# Patient Record
Sex: FEMALE | Race: WHITE | NOT HISPANIC OR LATINO | URBAN - METROPOLITAN AREA
[De-identification: names, ages, dates, MRNs, and addresses within clinical notes are randomized per-mention and may not be internally consistent; named-entity substitution may affect disease eponyms.]

---

## 2017-06-15 ENCOUNTER — OUTPATIENT (OUTPATIENT)
Dept: OUTPATIENT SERVICES | Facility: HOSPITAL | Age: 72
LOS: 1 days | Discharge: HOME | End: 2017-06-15

## 2017-06-28 DIAGNOSIS — K76.89 OTHER SPECIFIED DISEASES OF LIVER: ICD-10-CM

## 2017-06-28 DIAGNOSIS — Z11.1 ENCOUNTER FOR SCREENING FOR RESPIRATORY TUBERCULOSIS: ICD-10-CM

## 2017-06-28 DIAGNOSIS — N39.0 URINARY TRACT INFECTION, SITE NOT SPECIFIED: ICD-10-CM

## 2017-06-28 DIAGNOSIS — N18.2 CHRONIC KIDNEY DISEASE, STAGE 2 (MILD): ICD-10-CM

## 2017-06-28 DIAGNOSIS — G43.009 MIGRAINE WITHOUT AURA, NOT INTRACTABLE, WITHOUT STATUS MIGRAINOSUS: ICD-10-CM

## 2017-06-28 DIAGNOSIS — I10 ESSENTIAL (PRIMARY) HYPERTENSION: ICD-10-CM

## 2017-06-28 DIAGNOSIS — E11.9 TYPE 2 DIABETES MELLITUS WITHOUT COMPLICATIONS: ICD-10-CM

## 2017-06-28 DIAGNOSIS — E55.9 VITAMIN D DEFICIENCY, UNSPECIFIED: ICD-10-CM

## 2017-06-28 DIAGNOSIS — E78.00 PURE HYPERCHOLESTEROLEMIA, UNSPECIFIED: ICD-10-CM

## 2017-06-28 DIAGNOSIS — D64.9 ANEMIA, UNSPECIFIED: ICD-10-CM

## 2017-09-12 ENCOUNTER — OUTPATIENT (OUTPATIENT)
Dept: OUTPATIENT SERVICES | Facility: HOSPITAL | Age: 72
LOS: 1 days | Discharge: HOME | End: 2017-09-12

## 2017-09-12 DIAGNOSIS — E11.9 TYPE 2 DIABETES MELLITUS WITHOUT COMPLICATIONS: ICD-10-CM

## 2017-09-12 DIAGNOSIS — N18.2 CHRONIC KIDNEY DISEASE, STAGE 2 (MILD): ICD-10-CM

## 2017-09-12 DIAGNOSIS — N39.0 URINARY TRACT INFECTION, SITE NOT SPECIFIED: ICD-10-CM

## 2017-09-12 DIAGNOSIS — E78.00 PURE HYPERCHOLESTEROLEMIA, UNSPECIFIED: ICD-10-CM

## 2017-09-12 DIAGNOSIS — D64.9 ANEMIA, UNSPECIFIED: ICD-10-CM

## 2017-09-12 DIAGNOSIS — K76.89 OTHER SPECIFIED DISEASES OF LIVER: ICD-10-CM

## 2017-09-12 DIAGNOSIS — E55.9 VITAMIN D DEFICIENCY, UNSPECIFIED: ICD-10-CM

## 2017-10-05 ENCOUNTER — OUTPATIENT (OUTPATIENT)
Dept: OUTPATIENT SERVICES | Facility: HOSPITAL | Age: 72
LOS: 1 days | Discharge: HOME | End: 2017-10-05

## 2017-10-11 DIAGNOSIS — Z12.11 ENCOUNTER FOR SCREENING FOR MALIGNANT NEOPLASM OF COLON: ICD-10-CM

## 2017-10-11 DIAGNOSIS — D12.8 BENIGN NEOPLASM OF RECTUM: ICD-10-CM

## 2017-10-11 DIAGNOSIS — K57.30 DIVERTICULOSIS OF LARGE INTESTINE WITHOUT PERFORATION OR ABSCESS WITHOUT BLEEDING: ICD-10-CM

## 2017-10-11 DIAGNOSIS — E78.00 PURE HYPERCHOLESTEROLEMIA, UNSPECIFIED: ICD-10-CM

## 2017-10-11 DIAGNOSIS — K64.8 OTHER HEMORRHOIDS: ICD-10-CM

## 2017-10-11 DIAGNOSIS — Z88.1 ALLERGY STATUS TO OTHER ANTIBIOTIC AGENTS STATUS: ICD-10-CM

## 2017-10-12 ENCOUNTER — OUTPATIENT (OUTPATIENT)
Dept: OUTPATIENT SERVICES | Facility: HOSPITAL | Age: 72
LOS: 1 days | Discharge: HOME | End: 2017-10-12

## 2017-10-12 DIAGNOSIS — R80.9 PROTEINURIA, UNSPECIFIED: ICD-10-CM

## 2017-10-12 DIAGNOSIS — E11.22 TYPE 2 DIABETES MELLITUS WITH DIABETIC CHRONIC KIDNEY DISEASE: ICD-10-CM

## 2018-01-18 ENCOUNTER — OUTPATIENT (OUTPATIENT)
Dept: OUTPATIENT SERVICES | Facility: HOSPITAL | Age: 73
LOS: 1 days | Discharge: HOME | End: 2018-01-18

## 2018-01-18 DIAGNOSIS — D89.2 HYPERGAMMAGLOBULINEMIA, UNSPECIFIED: ICD-10-CM

## 2018-01-18 DIAGNOSIS — R80.8 OTHER PROTEINURIA: ICD-10-CM

## 2018-07-16 ENCOUNTER — OUTPATIENT (OUTPATIENT)
Dept: OUTPATIENT SERVICES | Facility: HOSPITAL | Age: 73
LOS: 1 days | Discharge: HOME | End: 2018-07-16

## 2018-07-16 DIAGNOSIS — R78.89 FINDING OF OTHER SPECIFIED SUBSTANCES, NOT NORMALLY FOUND IN BLOOD: ICD-10-CM

## 2018-07-16 DIAGNOSIS — I25.10 ATHEROSCLEROTIC HEART DISEASE OF NATIVE CORONARY ARTERY WITHOUT ANGINA PECTORIS: ICD-10-CM

## 2018-07-16 DIAGNOSIS — B58.81 TOXOPLASMA MYOCARDITIS: ICD-10-CM

## 2018-08-16 ENCOUNTER — OUTPATIENT (OUTPATIENT)
Dept: OUTPATIENT SERVICES | Facility: HOSPITAL | Age: 73
LOS: 1 days | Discharge: HOME | End: 2018-08-16

## 2018-08-16 DIAGNOSIS — E78.00 PURE HYPERCHOLESTEROLEMIA, UNSPECIFIED: ICD-10-CM

## 2018-08-16 DIAGNOSIS — R80.9 PROTEINURIA, UNSPECIFIED: ICD-10-CM

## 2019-02-13 ENCOUNTER — OUTPATIENT (OUTPATIENT)
Dept: OUTPATIENT SERVICES | Facility: HOSPITAL | Age: 74
LOS: 1 days | Discharge: HOME | End: 2019-02-13

## 2019-02-13 DIAGNOSIS — E78.00 PURE HYPERCHOLESTEROLEMIA, UNSPECIFIED: ICD-10-CM

## 2019-02-13 DIAGNOSIS — D64.9 ANEMIA, UNSPECIFIED: ICD-10-CM

## 2019-02-13 DIAGNOSIS — R80.9 PROTEINURIA, UNSPECIFIED: ICD-10-CM

## 2019-02-13 DIAGNOSIS — N18.2 CHRONIC KIDNEY DISEASE, STAGE 2 (MILD): ICD-10-CM

## 2019-02-13 DIAGNOSIS — E11.9 TYPE 2 DIABETES MELLITUS WITHOUT COMPLICATIONS: ICD-10-CM

## 2019-02-13 DIAGNOSIS — K76.89 OTHER SPECIFIED DISEASES OF LIVER: ICD-10-CM

## 2019-02-13 DIAGNOSIS — N39.0 URINARY TRACT INFECTION, SITE NOT SPECIFIED: ICD-10-CM

## 2019-02-13 DIAGNOSIS — E55.9 VITAMIN D DEFICIENCY, UNSPECIFIED: ICD-10-CM

## 2019-04-05 ENCOUNTER — OUTPATIENT (OUTPATIENT)
Dept: OUTPATIENT SERVICES | Facility: HOSPITAL | Age: 74
LOS: 1 days | Discharge: HOME | End: 2019-04-05

## 2019-04-05 DIAGNOSIS — A09 INFECTIOUS GASTROENTERITIS AND COLITIS, UNSPECIFIED: ICD-10-CM

## 2019-10-03 ENCOUNTER — OUTPATIENT (OUTPATIENT)
Dept: OUTPATIENT SERVICES | Facility: HOSPITAL | Age: 74
LOS: 1 days | Discharge: HOME | End: 2019-10-03

## 2019-10-04 DIAGNOSIS — Z00.00 ENCOUNTER FOR GENERAL ADULT MEDICAL EXAMINATION WITHOUT ABNORMAL FINDINGS: ICD-10-CM

## 2020-01-23 ENCOUNTER — OUTPATIENT (OUTPATIENT)
Dept: OUTPATIENT SERVICES | Facility: HOSPITAL | Age: 75
LOS: 1 days | Discharge: HOME | End: 2020-01-23

## 2020-01-23 VITALS
SYSTOLIC BLOOD PRESSURE: 124 MMHG | DIASTOLIC BLOOD PRESSURE: 68 MMHG | RESPIRATION RATE: 18 BRPM | HEART RATE: 66 BPM | OXYGEN SATURATION: 100 %

## 2020-01-23 VITALS
WEIGHT: 117.95 LBS | HEART RATE: 62 BPM | TEMPERATURE: 98 F | SYSTOLIC BLOOD PRESSURE: 109 MMHG | HEIGHT: 63 IN | OXYGEN SATURATION: 100 % | RESPIRATION RATE: 18 BRPM | DIASTOLIC BLOOD PRESSURE: 57 MMHG

## 2020-01-23 DIAGNOSIS — Z90.722 ACQUIRED ABSENCE OF OVARIES, BILATERAL: Chronic | ICD-10-CM

## 2020-01-23 DIAGNOSIS — Z90.10 ACQUIRED ABSENCE OF UNSPECIFIED BREAST AND NIPPLE: Chronic | ICD-10-CM

## 2020-01-23 DIAGNOSIS — Z98.82 BREAST IMPLANT STATUS: Chronic | ICD-10-CM

## 2020-01-23 DIAGNOSIS — Z98.890 OTHER SPECIFIED POSTPROCEDURAL STATES: Chronic | ICD-10-CM

## 2020-01-23 RX ORDER — TOBRAMYCIN AND DEXAMETHASONE 1; 3 MG/ML; MG/ML
1 SUSPENSION/ DROPS OPHTHALMIC ONCE
Refills: 0 | Status: DISCONTINUED | OUTPATIENT
Start: 2020-01-23 | End: 2020-02-07

## 2020-01-23 NOTE — ASU DISCHARGE PLAN (ADULT/PEDIATRIC) - PATIENT EDUCATION MATERIALS PROVIED
Provider pre-printed instructions given/Pre-printed instructions given for other (specify)/eye drops , follow up.

## 2020-01-28 DIAGNOSIS — H25.89 OTHER AGE-RELATED CATARACT: ICD-10-CM

## 2020-01-28 DIAGNOSIS — E78.00 PURE HYPERCHOLESTEROLEMIA, UNSPECIFIED: ICD-10-CM

## 2020-01-28 DIAGNOSIS — I10 ESSENTIAL (PRIMARY) HYPERTENSION: ICD-10-CM

## 2020-01-28 DIAGNOSIS — Z90.10 ACQUIRED ABSENCE OF UNSPECIFIED BREAST AND NIPPLE: ICD-10-CM

## 2020-01-28 DIAGNOSIS — Z88.1 ALLERGY STATUS TO OTHER ANTIBIOTIC AGENTS STATUS: ICD-10-CM

## 2020-01-30 ENCOUNTER — OUTPATIENT (OUTPATIENT)
Dept: OUTPATIENT SERVICES | Facility: HOSPITAL | Age: 75
LOS: 1 days | Discharge: HOME | End: 2020-01-30

## 2020-01-30 VITALS
HEART RATE: 56 BPM | WEIGHT: 117.95 LBS | RESPIRATION RATE: 17 BRPM | HEIGHT: 63 IN | DIASTOLIC BLOOD PRESSURE: 57 MMHG | OXYGEN SATURATION: 98 % | SYSTOLIC BLOOD PRESSURE: 119 MMHG | TEMPERATURE: 98 F

## 2020-01-30 VITALS
DIASTOLIC BLOOD PRESSURE: 64 MMHG | HEART RATE: 73 BPM | OXYGEN SATURATION: 100 % | SYSTOLIC BLOOD PRESSURE: 126 MMHG | RESPIRATION RATE: 17 BRPM

## 2020-01-30 DIAGNOSIS — Z90.722 ACQUIRED ABSENCE OF OVARIES, BILATERAL: Chronic | ICD-10-CM

## 2020-01-30 DIAGNOSIS — Z98.890 OTHER SPECIFIED POSTPROCEDURAL STATES: Chronic | ICD-10-CM

## 2020-01-30 DIAGNOSIS — Z98.82 BREAST IMPLANT STATUS: Chronic | ICD-10-CM

## 2020-01-30 DIAGNOSIS — Z90.10 ACQUIRED ABSENCE OF UNSPECIFIED BREAST AND NIPPLE: Chronic | ICD-10-CM

## 2020-02-04 DIAGNOSIS — Z88.1 ALLERGY STATUS TO OTHER ANTIBIOTIC AGENTS STATUS: ICD-10-CM

## 2020-02-04 DIAGNOSIS — I10 ESSENTIAL (PRIMARY) HYPERTENSION: ICD-10-CM

## 2020-02-04 DIAGNOSIS — H25.12 AGE-RELATED NUCLEAR CATARACT, LEFT EYE: ICD-10-CM

## 2020-06-30 PROBLEM — I10 ESSENTIAL (PRIMARY) HYPERTENSION: Chronic | Status: ACTIVE | Noted: 2020-01-23

## 2020-06-30 PROBLEM — Z86.39 PERSONAL HISTORY OF OTHER ENDOCRINE, NUTRITIONAL AND METABOLIC DISEASE: Chronic | Status: ACTIVE | Noted: 2020-01-23

## 2020-07-14 ENCOUNTER — APPOINTMENT (OUTPATIENT)
Dept: OBGYN | Facility: CLINIC | Age: 75
End: 2020-07-14
Payer: MEDICARE

## 2020-07-14 PROCEDURE — 81002 URINALYSIS NONAUTO W/O SCOPE: CPT

## 2020-07-14 PROCEDURE — 99213 OFFICE O/P EST LOW 20 MIN: CPT

## 2020-09-01 ENCOUNTER — APPOINTMENT (OUTPATIENT)
Dept: OBGYN | Facility: CLINIC | Age: 75
End: 2020-09-01
Payer: MEDICARE

## 2020-09-01 PROCEDURE — 99213 OFFICE O/P EST LOW 20 MIN: CPT

## 2020-09-01 PROCEDURE — 81002 URINALYSIS NONAUTO W/O SCOPE: CPT

## 2020-10-27 ENCOUNTER — APPOINTMENT (OUTPATIENT)
Dept: OBGYN | Facility: CLINIC | Age: 75
End: 2020-10-27
Payer: MEDICARE

## 2020-10-27 PROCEDURE — 81002 URINALYSIS NONAUTO W/O SCOPE: CPT

## 2020-10-27 PROCEDURE — 99213 OFFICE O/P EST LOW 20 MIN: CPT

## 2020-10-29 ENCOUNTER — APPOINTMENT (OUTPATIENT)
Dept: OBGYN | Facility: CLINIC | Age: 75
End: 2020-10-29
Payer: MEDICARE

## 2020-10-29 PROCEDURE — 81002 URINALYSIS NONAUTO W/O SCOPE: CPT

## 2020-10-29 PROCEDURE — 99211 OFF/OP EST MAY X REQ PHY/QHP: CPT

## 2020-12-29 ENCOUNTER — APPOINTMENT (OUTPATIENT)
Dept: OBGYN | Facility: CLINIC | Age: 75
End: 2020-12-29
Payer: MEDICARE

## 2020-12-29 PROCEDURE — 99213 OFFICE O/P EST LOW 20 MIN: CPT

## 2020-12-29 PROCEDURE — 81002 URINALYSIS NONAUTO W/O SCOPE: CPT

## 2021-01-05 ENCOUNTER — NON-APPOINTMENT (OUTPATIENT)
Age: 76
End: 2021-01-05

## 2021-01-05 DIAGNOSIS — D05.10 INTRADUCTAL CARCINOMA IN SITU OF UNSPECIFIED BREAST: ICD-10-CM

## 2021-01-05 DIAGNOSIS — Z78.9 OTHER SPECIFIED HEALTH STATUS: ICD-10-CM

## 2021-01-05 DIAGNOSIS — Z80.3 FAMILY HISTORY OF MALIGNANT NEOPLASM OF BREAST: ICD-10-CM

## 2021-01-05 LAB — CYTOLOGY CVX/VAG DOC THIN PREP: NORMAL

## 2021-02-16 ENCOUNTER — APPOINTMENT (OUTPATIENT)
Dept: OBGYN | Facility: CLINIC | Age: 76
End: 2021-02-16

## 2021-02-25 ENCOUNTER — APPOINTMENT (OUTPATIENT)
Dept: OBGYN | Facility: CLINIC | Age: 76
End: 2021-02-25
Payer: MEDICARE

## 2021-02-25 ENCOUNTER — LABORATORY RESULT (OUTPATIENT)
Age: 76
End: 2021-02-25

## 2021-02-25 VITALS
DIASTOLIC BLOOD PRESSURE: 60 MMHG | SYSTOLIC BLOOD PRESSURE: 104 MMHG | HEART RATE: 70 BPM | OXYGEN SATURATION: 99 % | WEIGHT: 125 LBS | TEMPERATURE: 98 F | HEIGHT: 63 IN | BODY MASS INDEX: 22.15 KG/M2

## 2021-02-25 LAB
BILIRUB UR QL STRIP: NEGATIVE
CLARITY UR: CLEAR
COLLECTION METHOD: NORMAL
GLUCOSE UR-MCNC: NEGATIVE
HCG UR QL: 0.2 EU/DL
HGB UR QL STRIP.AUTO: NEGATIVE
KETONES UR-MCNC: NEGATIVE
LEUKOCYTE ESTERASE UR QL STRIP: NORMAL
NITRITE UR QL STRIP: NEGATIVE
PH UR STRIP: 7
PROT UR STRIP-MCNC: NEGATIVE
SP GR UR STRIP: 1.03

## 2021-02-25 PROCEDURE — 99213 OFFICE O/P EST LOW 20 MIN: CPT

## 2021-02-25 PROCEDURE — 81003 URINALYSIS AUTO W/O SCOPE: CPT | Mod: QW

## 2021-02-25 NOTE — PHYSICAL EXAM
[Appropriately responsive] : appropriately responsive [Alert] : alert [No Acute Distress] : no acute distress [No Lymphadenopathy] : no lymphadenopathy [Soft] : soft [Non-tender] : non-tender [Non-distended] : non-distended [No HSM] : No HSM [No Lesions] : no lesions [No Mass] : no mass [Oriented x3] : oriented x3 [FreeTextEntry7] : old tummy tuck scar  [Labia Majora] : normal [Labia Minora] : normal [Normal] : normal [Uterine Adnexae] : absent [FreeTextEntry6] : pessory in place [id number] cleaned and reinserted

## 2021-02-25 NOTE — HISTORY OF PRESENT ILLNESS
[Patient reported PAP Smear was normal] : Patient reported PAP Smear was normal [LMP unknown] : LMP unknown [FreeTextEntry1] : Patient present for gyn exam and also pessary cleaning. [Mammogramdate] : 2020 [BreastSonogramDate] : N/A [PapSmeardate] : 5/21/20 [BoneDensityDate] : 6/1/20 [ColonoscopyDate] : 2015

## 2021-02-25 NOTE — PLAN
[FreeTextEntry1] : exam complete with pap \par pt decined breast exam \par states had mammo 12/2020 and sees specialist  at [facility] \par diana exercises everyday \par cqa vit c d \par increase po fluids \par recommend colonsoscopy\par rto 4-6mths

## 2021-02-26 ENCOUNTER — LABORATORY RESULT (OUTPATIENT)
Age: 76
End: 2021-02-26

## 2021-04-22 ENCOUNTER — APPOINTMENT (OUTPATIENT)
Dept: OBGYN | Facility: CLINIC | Age: 76
End: 2021-04-22
Payer: MEDICARE

## 2021-04-22 VITALS
HEIGHT: 63 IN | BODY MASS INDEX: 22.68 KG/M2 | WEIGHT: 128 LBS | HEART RATE: 90 BPM | OXYGEN SATURATION: 98 % | TEMPERATURE: 98 F | SYSTOLIC BLOOD PRESSURE: 120 MMHG | DIASTOLIC BLOOD PRESSURE: 80 MMHG

## 2021-04-22 LAB
BILIRUB UR QL STRIP: NORMAL
GLUCOSE UR-MCNC: NORMAL
HGB UR QL STRIP.AUTO: NORMAL
KETONES UR-MCNC: NORMAL
LEUKOCYTE ESTERASE UR QL STRIP: NORMAL
NITRITE UR QL STRIP: NORMAL
PROT UR STRIP-MCNC: NORMAL

## 2021-04-22 PROCEDURE — 99213 OFFICE O/P EST LOW 20 MIN: CPT

## 2021-04-22 NOTE — HISTORY OF PRESENT ILLNESS
[Patient reported PAP Smear was normal] : Patient reported PAP Smear was normal [Gonorrhea test offered] : Gonorrhea test offered [Chlamydia test offered] : Chlamydia test offered [Trichomonas test offered] : Trichomonas test offered [HPV test offered] : HPV test offered [FreeTextEntry1] : pt present for pessary cleaning\par  [PapSmeardate] : 2/26/2021 [GonorrheaDate] : 2/26/2021 [ChlamydiaDate] : 2/26/2021 [TrichomonasDate] : 2/26/2021 [HPVDate] : 2/26/2021

## 2021-04-22 NOTE — PLAN
[FreeTextEntry1] : pessary removed and cleaned and reinserted \par pt tolerated well \par exam wnl \par reviewed care with pt \par to f/u 8 weeks

## 2021-05-18 ENCOUNTER — EMERGENCY (EMERGENCY)
Facility: HOSPITAL | Age: 76
LOS: 0 days | Discharge: HOME | End: 2021-05-18
Attending: EMERGENCY MEDICINE | Admitting: EMERGENCY MEDICINE
Payer: MEDICARE

## 2021-05-18 VITALS
HEART RATE: 69 BPM | OXYGEN SATURATION: 99 % | RESPIRATION RATE: 18 BRPM | TEMPERATURE: 98 F | DIASTOLIC BLOOD PRESSURE: 71 MMHG | SYSTOLIC BLOOD PRESSURE: 127 MMHG

## 2021-05-18 VITALS
SYSTOLIC BLOOD PRESSURE: 139 MMHG | WEIGHT: 128.09 LBS | OXYGEN SATURATION: 99 % | HEART RATE: 76 BPM | RESPIRATION RATE: 16 BRPM | TEMPERATURE: 98 F | DIASTOLIC BLOOD PRESSURE: 79 MMHG | HEIGHT: 63 IN

## 2021-05-18 DIAGNOSIS — Z98.82 BREAST IMPLANT STATUS: Chronic | ICD-10-CM
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
## 2021-05-18 DIAGNOSIS — Z98.890 OTHER SPECIFIED POSTPROCEDURAL STATES: Chronic | ICD-10-CM

## 2021-05-18 DIAGNOSIS — Z90.722 ACQUIRED ABSENCE OF OVARIES, BILATERAL: Chronic | ICD-10-CM

## 2021-05-18 DIAGNOSIS — M25.562 PAIN IN LEFT KNEE: ICD-10-CM
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
## 2021-05-18 DIAGNOSIS — Z90.10 ACQUIRED ABSENCE OF UNSPECIFIED BREAST AND NIPPLE: Chronic | ICD-10-CM

## 2021-05-18 PROCEDURE — 73564 X-RAY EXAM KNEE 4 OR MORE: CPT | Mod: 26,LT

## 2021-05-18 PROCEDURE — 73522 X-RAY EXAM HIPS BI 3-4 VIEWS: CPT | Mod: 26

## 2021-05-18 PROCEDURE — 73552 X-RAY EXAM OF FEMUR 2/>: CPT | Mod: 26,LT

## 2021-05-18 PROCEDURE — 70450 CT HEAD/BRAIN W/O DYE: CPT | Mod: 26,MA

## 2021-05-18 PROCEDURE — 73590 X-RAY EXAM OF LOWER LEG: CPT | Mod: 26,LT

## 2021-05-18 PROCEDURE — 99284 EMERGENCY DEPT VISIT MOD MDM: CPT | Mod: GC

## 2021-05-18 RX ORDER — ACETAMINOPHEN 500 MG
650 TABLET ORAL ONCE
Refills: 0 | Status: COMPLETED | OUTPATIENT
Start: 2021-05-18 | End: 2021-05-18

## 2021-05-18 RX ORDER — METHOCARBAMOL 500 MG/1
1500 TABLET, FILM COATED ORAL ONCE
Refills: 0 | Status: COMPLETED | OUTPATIENT
Start: 2021-05-18 | End: 2021-05-18

## 2021-05-18 RX ORDER — IBUPROFEN 200 MG
600 TABLET ORAL ONCE
Refills: 0 | Status: COMPLETED | OUTPATIENT
Start: 2021-05-18 | End: 2021-05-18

## 2021-05-18 RX ADMIN — METHOCARBAMOL 1500 MILLIGRAM(S): 500 TABLET, FILM COATED ORAL at 15:51

## 2021-05-18 RX ADMIN — Medication 600 MILLIGRAM(S): at 15:51

## 2021-05-18 RX ADMIN — Medication 650 MILLIGRAM(S): at 15:51

## 2021-05-18 NOTE — ED PROVIDER NOTE - CARE PROVIDER_API CALL
Brie Arriola)  Orthopaedic Surgery  [address] [place] [id number]  Phone: [phone]  Fax: [phone]  Follow Up Time: 4-6 Days

## 2021-05-18 NOTE — ED PROVIDER NOTE - ATTENDING CONTRIBUTION TO CARE
76yo woman c/o L knee pain s/p slip and fall PTA while shopping. She was able to get up and walk, and drove from [place] to [facility] to be seen in the ED. VS, exam as noted, pt extremely anxious and easily tearful on my exam, c/o mild HA, but NCAT,, ANTHONY, EOMI, neck supple, lungs CTA, CVS1S2 RRR abd soft, NT. Knee with mild diffuse tenderness but no significant swelling or deformity. Neuro grossly intact. CT head, XR knee, reassess.

## 2021-05-18 NOTE — ED PROVIDER NOTE - WET READ LAUNCH FT
There are 4 Wet Read(s) to document. There are 3 Wet Read(s) to document. There are no Wet Read(s) to document.

## 2021-05-18 NOTE — ED PROVIDER NOTE - NS ED ROS FT
Consitutional: No fever, chills, weight loss, generalized fatigue  Eyes:  No visual changes, eye pain or discharge  ENMT:  No hearing changes, pain, discharge or infections; No neck pain, stiffness, or edema  Cardiac:  No chest pain, SOB or edema  Respiratory:  No cough, hemoptysis, or rhinorrhea  GI:  No abdominal pain, nausea, vomiting, constipation or diarrhea  :  No dysuria, frequency or burning  MS:  No myalgia, muscle weakness, joint pain or back pain +left knee pain   Neuro:  No headache or weakness.  No LOC.  Skin:  No skin rash, ecchymosis, abrasion, or lesions

## 2021-05-18 NOTE — ED ADULT TRIAGE NOTE - CHIEF COMPLAINT QUOTE
patient states she slipped on a  while she was at [facility], patient complaining of left leg and back pain. patient denies any denies any head injury or loc

## 2021-05-18 NOTE — ED ADULT NURSE NOTE - OBJECTIVE STATEMENT
Pt c/o left leg and back pain s/p slip and fall at [employer] from  on the floor. Denies head injury/LOC/AC use. Denies cp, sob, fevers, cough, n/v/d, abd pain, urinary symptoms, headache, blurry vision.

## 2021-05-18 NOTE — ED ADULT NURSE NOTE - CHIEF COMPLAINT QUOTE
patient states she slipped on a  while she was at [facility], patient complaining of left leg and back pain. patient denies any denies any head injury or loc

## 2021-05-18 NOTE — ED PROVIDER NOTE - PATIENT PORTAL LINK FT
You can access the FollowMyHealth Patient Portal offered by [facility] by registering at the following website: [email or web address]. By joining [employer]’s FollowMyHealth portal, you will also be able to view your health information using other applications (apps) compatible with our system.

## 2021-05-18 NOTE — ED ADULT NURSE NOTE - NSFALLRSKASSESSTYPE_ED_ALL_ED
Initial (On Arrival) Patient with a hx of HTN on 3 medication at home. Losartan 50, HCTZ 12.5 and nifedipine 30XL. Found to be HTN in the ED.   - pending orthostatics, if negative will restart losartan 50 and nifedipine 30  - hold diuretic in the setting of dehydration

## 2021-05-18 NOTE — ED PROVIDER NOTE - PHYSICAL EXAMINATION
CONSTITUTIONAL: Well-developed; well-nourished; in no acute distress  SKIN: warm, dry  HEAD: Normocephalic; atraumatic  EYES: PERRL, EOMI, no conjunctival erythema  NECK: Supple; non tender.  CARD: S1, S2 normal; no murmurs, gallops, or rubs. Regular rate and rhythm  RESP: No wheezes, rales or rhonchi  ABD: soft nontender nondistended  EXT:  No edema, left ACL PCL MCL LCL all intact full ROM  NEURO: Alert, oriented, grossly unremarkable full strength and sensation b/l LE  PSYCH: Cooperative, appropriate.

## 2021-05-18 NOTE — ED PROVIDER NOTE - OBJECTIVE STATEMENT
75yoF p/w mechanical fall slipping on  at [facility] and fell on his left knee and left butt. she started developing HA few hours after being in ED. no numbness weakness paresthesia. she got up walked to her car drove home from [place] and her  brought her here. no head trauma, LOC, N, V, or AC use.

## 2021-05-18 NOTE — ED PROVIDER NOTE - CLINICAL SUMMARY MEDICAL DECISION MAKING FREE TEXT BOX
Head CT negative for acute injury. XR neg for fx. Knee immobilizer improved sx. Pt d/c to f/u ortho.

## 2021-06-25 ENCOUNTER — APPOINTMENT (OUTPATIENT)
Dept: OBGYN | Facility: CLINIC | Age: 76
End: 2021-06-25
Payer: MEDICARE

## 2021-06-25 VITALS
OXYGEN SATURATION: 98 % | HEART RATE: 60 BPM | DIASTOLIC BLOOD PRESSURE: 64 MMHG | BODY MASS INDEX: 22.15 KG/M2 | SYSTOLIC BLOOD PRESSURE: 108 MMHG | HEIGHT: 63 IN | TEMPERATURE: 98 F | WEIGHT: 125 LBS

## 2021-06-25 LAB
BILIRUB UR QL STRIP: NEGATIVE
CLARITY UR: CLEAR
COLLECTION METHOD: NORMAL
GLUCOSE UR-MCNC: NEGATIVE
HCG UR QL: 0.2 EU/DL
HGB UR QL STRIP.AUTO: NEGATIVE
KETONES UR-MCNC: NEGATIVE
LEUKOCYTE ESTERASE UR QL STRIP: NEGATIVE
NITRITE UR QL STRIP: NEGATIVE
PH UR STRIP: 5.5
PROT UR STRIP-MCNC: NEGATIVE
SP GR UR STRIP: 1.02

## 2021-06-25 PROCEDURE — 99213 OFFICE O/P EST LOW 20 MIN: CPT

## 2021-06-25 PROCEDURE — 81003 URINALYSIS AUTO W/O SCOPE: CPT | Mod: QW

## 2021-06-25 NOTE — PLAN
[FreeTextEntry1] : pt presents for pessary maintenance \par no c/o pain, no bleeding \par pessary removed intact cleaned and reinserted \par rto 6weeks/prn

## 2021-06-25 NOTE — PHYSICAL EXAM
[Labia Majora] : normal [Labia Minora] : normal [Normal] : normal [Uterine Adnexae] : normal [FreeTextEntry5] : p

## 2021-06-25 NOTE — HISTORY OF PRESENT ILLNESS
[Patient reported PAP Smear was normal] : Patient reported PAP Smear was normal [FreeTextEntry1] : PATIENT PRESENT FOR PESSARY CLEANING. [Mammogramdate] : 12/20 [PapSmeardate] : 2/25/21 [BoneDensityDate] : 6/1/20 [ColonoscopyDate] : 2015

## 2021-08-26 ENCOUNTER — LABORATORY RESULT (OUTPATIENT)
Age: 76
End: 2021-08-26

## 2021-08-27 ENCOUNTER — RESULT CHARGE (OUTPATIENT)
Age: 76
End: 2021-08-27

## 2021-08-27 ENCOUNTER — APPOINTMENT (OUTPATIENT)
Dept: OBGYN | Facility: CLINIC | Age: 76
End: 2021-08-27
Payer: MEDICARE

## 2021-08-27 VITALS
SYSTOLIC BLOOD PRESSURE: 112 MMHG | BODY MASS INDEX: 21.79 KG/M2 | HEIGHT: 63 IN | WEIGHT: 123 LBS | HEART RATE: 67 BPM | TEMPERATURE: 98 F | DIASTOLIC BLOOD PRESSURE: 72 MMHG | OXYGEN SATURATION: 98 %

## 2021-08-27 PROCEDURE — 81002 URINALYSIS NONAUTO W/O SCOPE: CPT

## 2021-08-27 NOTE — PLAN
[FreeTextEntry1] : PAP DONE\par PESSARY CLEANING\par UA/ C&S RX GIVEN\par TVS RX GIVEN\par Z-BUD SENT TO PHARMACY

## 2021-08-27 NOTE — HISTORY OF PRESENT ILLNESS
[Patient reported mammogram was normal] : Patient reported mammogram was normal [Patient reported PAP Smear was normal] : Patient reported PAP Smear was normal [Patient reported bone density results were normal] : Patient reported bone density results were normal [Patient reported colonoscopy was normal] : Patient reported colonoscopy was normal [FreeTextEntry1] : Pt present for gyn exam and pessary cleaning.\par  [Gonorrhea test declined] : Gonorrhea test declined [Chlamydia test declined] : Chlamydia test declined [Trichomonas test declined] : Trichomonas test declined [HPV test declined] : HPV test declined [TextBox_4] : Patient present for pessary cleaning and also gyn exam/ pap. [Mammogramdate] : 4/2021 [BreastSonogramDate] : -0- [PapSmeardate] : 2/26/2021 [BoneDensityDate] : -0- [ColonoscopyDate] : 2015 [PGHxTotal] : 3 [[facility]iving] : 3 [Ultrasound] : ultrasound [TextBox_27] : TVS/ PELVIC US- 9/7/18 [LMP unknown] : LMP unknown [Yes] : pregnancy [TextBox_9] : 11 [TextBox_13] : 28 [TextBox_15] : 5 [unknown] : Patient is unsure of the date of her LMP

## 2021-09-11 ENCOUNTER — LABORATORY RESULT (OUTPATIENT)
Age: 76
End: 2021-09-11

## 2021-10-18 ENCOUNTER — OUTPATIENT (OUTPATIENT)
Dept: OUTPATIENT SERVICES | Facility: HOSPITAL | Age: 76
LOS: 1 days | Discharge: HOME | End: 2021-10-18

## 2021-10-18 DIAGNOSIS — Z90.722 ACQUIRED ABSENCE OF OVARIES, BILATERAL: Chronic | ICD-10-CM

## 2021-10-18 DIAGNOSIS — Z98.890 OTHER SPECIFIED POSTPROCEDURAL STATES: Chronic | ICD-10-CM

## 2021-10-18 DIAGNOSIS — Z98.82 BREAST IMPLANT STATUS: Chronic | ICD-10-CM

## 2021-10-18 DIAGNOSIS — Z90.10 ACQUIRED ABSENCE OF UNSPECIFIED BREAST AND NIPPLE: Chronic | ICD-10-CM

## 2021-10-18 DIAGNOSIS — Z11.59 ENCOUNTER FOR SCREENING FOR OTHER VIRAL DISEASES: ICD-10-CM

## 2021-10-21 ENCOUNTER — TRANSCRIPTION ENCOUNTER (OUTPATIENT)
Age: 76
End: 2021-10-21

## 2021-10-21 ENCOUNTER — RESULT REVIEW (OUTPATIENT)
Age: 76
End: 2021-10-21

## 2021-10-21 ENCOUNTER — OUTPATIENT (OUTPATIENT)
Dept: OUTPATIENT SERVICES | Facility: HOSPITAL | Age: 76
LOS: 1 days | Discharge: HOME | End: 2021-10-21
Payer: MEDICARE

## 2021-10-21 VITALS
WEIGHT: 123.9 LBS | HEART RATE: 55 BPM | DIASTOLIC BLOOD PRESSURE: 77 MMHG | RESPIRATION RATE: 18 BRPM | HEIGHT: 63 IN | SYSTOLIC BLOOD PRESSURE: 142 MMHG

## 2021-10-21 VITALS
HEART RATE: 58 BPM | DIASTOLIC BLOOD PRESSURE: 76 MMHG | RESPIRATION RATE: 23 BRPM | SYSTOLIC BLOOD PRESSURE: 157 MMHG | OXYGEN SATURATION: 96 %

## 2021-10-21 DIAGNOSIS — Z90.722 ACQUIRED ABSENCE OF OVARIES, BILATERAL: Chronic | ICD-10-CM

## 2021-10-21 DIAGNOSIS — Z98.890 OTHER SPECIFIED POSTPROCEDURAL STATES: Chronic | ICD-10-CM

## 2021-10-21 DIAGNOSIS — Z98.82 BREAST IMPLANT STATUS: Chronic | ICD-10-CM

## 2021-10-21 DIAGNOSIS — Z90.10 ACQUIRED ABSENCE OF UNSPECIFIED BREAST AND NIPPLE: Chronic | ICD-10-CM

## 2021-10-21 PROCEDURE — 88305 TISSUE EXAM BY PATHOLOGIST: CPT | Mod: 26

## 2021-10-21 PROCEDURE — 88341 IMHCHEM/IMCYTCHM EA ADD ANTB: CPT | Mod: 26

## 2021-10-21 PROCEDURE — 88342 IMHCHEM/IMCYTCHM 1ST ANTB: CPT | Mod: 26

## 2021-10-21 PROCEDURE — 88312 SPECIAL STAINS GROUP 1: CPT | Mod: 26

## 2021-10-21 RX ORDER — PANTOPRAZOLE SODIUM 20 MG/1
1 TABLET, DELAYED RELEASE ORAL
Qty: 0 | Refills: 0 | DISCHARGE

## 2021-10-21 RX ORDER — SIMVASTATIN 20 MG/1
1 TABLET, FILM COATED ORAL
Qty: 0 | Refills: 0 | DISCHARGE

## 2021-10-21 RX ORDER — LOSARTAN/HYDROCHLOROTHIAZIDE 100MG-25MG
1 TABLET ORAL
Qty: 0 | Refills: 0 | DISCHARGE

## 2021-10-21 NOTE — ASU PATIENT PROFILE, ADULT - NSICDXPASTSURGICALHX_GEN_ALL_CORE_FT
PAST SURGICAL HISTORY:  H/O bilateral breast implants     H/O bilateral oophorectomy     H/O blepharoplasty     H/O mastectomy bilateral    History of surgery RIGHT EYE CATARACT EXTRACTION WITH LENS IMPLANT

## 2021-10-22 LAB
SURGICAL PATHOLOGY STUDY: SIGNIFICANT CHANGE UP
SURGICAL PATHOLOGY STUDY: SIGNIFICANT CHANGE UP

## 2021-10-27 DIAGNOSIS — Z12.11 ENCOUNTER FOR SCREENING FOR MALIGNANT NEOPLASM OF COLON: ICD-10-CM

## 2021-10-27 DIAGNOSIS — I10 ESSENTIAL (PRIMARY) HYPERTENSION: ICD-10-CM

## 2021-10-27 DIAGNOSIS — K57.30 DIVERTICULOSIS OF LARGE INTESTINE WITHOUT PERFORATION OR ABSCESS WITHOUT BLEEDING: ICD-10-CM

## 2021-10-27 DIAGNOSIS — Z88.1 ALLERGY STATUS TO OTHER ANTIBIOTIC AGENTS STATUS: ICD-10-CM

## 2021-10-27 DIAGNOSIS — D12.2 BENIGN NEOPLASM OF ASCENDING COLON: ICD-10-CM

## 2021-10-27 DIAGNOSIS — E78.00 PURE HYPERCHOLESTEROLEMIA, UNSPECIFIED: ICD-10-CM

## 2021-10-27 DIAGNOSIS — K20.90 ESOPHAGITIS, UNSPECIFIED WITHOUT BLEEDING: ICD-10-CM

## 2021-10-27 DIAGNOSIS — K64.8 OTHER HEMORRHOIDS: ICD-10-CM

## 2021-10-27 DIAGNOSIS — K25.9 GASTRIC ULCER, UNSPECIFIED AS ACUTE OR CHRONIC, WITHOUT HEMORRHAGE OR PERFORATION: ICD-10-CM

## 2021-10-27 DIAGNOSIS — K44.9 DIAPHRAGMATIC HERNIA WITHOUT OBSTRUCTION OR GANGRENE: ICD-10-CM

## 2021-11-20 ENCOUNTER — APPOINTMENT (OUTPATIENT)
Dept: OBGYN | Facility: CLINIC | Age: 76
End: 2021-11-20
Payer: MEDICARE

## 2021-11-20 PROCEDURE — 57160 INSERT PESSARY/OTHER DEVICE: CPT

## 2021-11-20 PROCEDURE — 81003 URINALYSIS AUTO W/O SCOPE: CPT | Mod: QW

## 2021-11-20 PROCEDURE — A4561: CPT

## 2021-11-20 PROCEDURE — 99213 OFFICE O/P EST LOW 20 MIN: CPT | Mod: 25

## 2021-11-23 VITALS
TEMPERATURE: 97.8 F | HEIGHT: 63 IN | DIASTOLIC BLOOD PRESSURE: 62 MMHG | HEART RATE: 66 BPM | OXYGEN SATURATION: 98 % | SYSTOLIC BLOOD PRESSURE: 108 MMHG | BODY MASS INDEX: 21.62 KG/M2 | WEIGHT: 122 LBS

## 2021-11-23 LAB
BILIRUB UR QL STRIP: NEGATIVE
CLARITY UR: CLEAR
COLLECTION METHOD: NORMAL
GLUCOSE UR-MCNC: NEGATIVE
HCG UR QL: 0.2 EU/DL
HGB UR QL STRIP.AUTO: NEGATIVE
KETONES UR-MCNC: NEGATIVE
LEUKOCYTE ESTERASE UR QL STRIP: NORMAL
NITRITE UR QL STRIP: NEGATIVE
PH UR STRIP: 7
PROT UR STRIP-MCNC: NEGATIVE
SP GR UR STRIP: 1.02

## 2021-11-23 NOTE — PLAN
[FreeTextEntry1] : PESSARY CLEANING\par SIMPLE CYST IN THE RIGHT ADNEXA DISCUSSED PELVIC US DOWN ON 10/8/21\par \par RTO 2MTHS/ PRN

## 2021-11-23 NOTE — HISTORY OF PRESENT ILLNESS
[Ultrasound] : ultrasound [LMP unknown] : LMP unknown [Yes] : pregnancy [FreeTextEntry1] : PATIENT PRESENT FOR PESSARY CLEANING [TextBox_4] : PATIENT PRESENT FOR PESSARY CLEANING. [Mammogramdate] : 4/21 [BreastSonogramDate] : -0- [PapSmeardate] : 8/31/21 [BoneDensityDate] : 6/1/20 [ColonoscopyDate] : 10/21 [PGHxTotal] : 3 [[facility] [[facility] [TextBox_27] : TVS/ PELVIC US- 10/8/21 [TextBox_9] : 11 [TextBox_13] : 28 [TextBox_15] : 5

## 2021-12-13 ENCOUNTER — APPOINTMENT (OUTPATIENT)
Dept: UROGYNECOLOGY | Facility: CLINIC | Age: 76
End: 2021-12-13
Payer: MEDICARE

## 2021-12-13 VITALS
WEIGHT: 118 LBS | BODY MASS INDEX: 21.71 KG/M2 | DIASTOLIC BLOOD PRESSURE: 66 MMHG | SYSTOLIC BLOOD PRESSURE: 109 MMHG | HEART RATE: 67 BPM | HEIGHT: 62 IN

## 2021-12-13 DIAGNOSIS — N99.3 PROLAPSE OF VAGINAL VAULT AFTER HYSTERECTOMY: ICD-10-CM

## 2021-12-13 DIAGNOSIS — Z86.39 PERSONAL HISTORY OF OTHER ENDOCRINE, NUTRITIONAL AND METABOLIC DISEASE: ICD-10-CM

## 2021-12-13 DIAGNOSIS — Z86.79 PERSONAL HISTORY OF OTHER DISEASES OF THE CIRCULATORY SYSTEM: ICD-10-CM

## 2021-12-13 DIAGNOSIS — R31.29 OTHER MICROSCOPIC HEMATURIA: ICD-10-CM

## 2021-12-13 DIAGNOSIS — R82.998 OTHER ABNORMAL FINDINGS IN URINE: ICD-10-CM

## 2021-12-13 DIAGNOSIS — N81.6 RECTOCELE: ICD-10-CM

## 2021-12-13 DIAGNOSIS — Z46.89 ENCOUNTER FOR FITTING AND ADJUSTMENT OF OTHER SPECIFIED DEVICES: ICD-10-CM

## 2021-12-13 LAB
BILIRUB UR QL STRIP: NEGATIVE
CLARITY UR: CLEAR
COLLECTION METHOD: NORMAL
GLUCOSE UR-MCNC: NEGATIVE
HCG UR QL: 1 EU/DL
HGB UR QL STRIP.AUTO: NEGATIVE
KETONES UR-MCNC: NEGATIVE
LEUKOCYTE ESTERASE UR QL STRIP: NEGATIVE
NITRITE UR QL STRIP: NEGATIVE
PH UR STRIP: 6
PROT UR STRIP-MCNC: NEGATIVE
SP GR UR STRIP: 1.02

## 2021-12-13 PROCEDURE — 99205 OFFICE O/P NEW HI 60 MIN: CPT

## 2021-12-13 PROCEDURE — 81003 URINALYSIS AUTO W/O SCOPE: CPT | Mod: QW

## 2021-12-13 PROCEDURE — 99215 OFFICE O/P EST HI 40 MIN: CPT

## 2021-12-13 PROCEDURE — 51701 INSERT BLADDER CATHETER: CPT

## 2021-12-13 RX ORDER — AZITHROMYCIN 250 MG/1
250 TABLET, FILM COATED ORAL
Qty: 5 | Refills: 0 | Status: DISCONTINUED | COMMUNITY
Start: 2021-08-27 | End: 2021-12-13

## 2021-12-13 NOTE — HISTORY OF PRESENT ILLNESS
[FreeTextEntry1] : \par Pt with pelvic floor dysfunction here for urogynecologic evaluation. She describes: \par \par Chief PFD:  concerned that something is wrong\par \par 10/8/21: pelvic sono: uterus 4cm, endometrial lining 1mm, simple R peritoneal cyst: 1.2cm\par \par Pelvic organ prolapse: s/p BSO (strong family history of cancer), s/p abdominoplasty, using pessary and having it cleaned with Dr Ruby. Denies pain or bleeding with the pessary, only reports pain and bleeding during cleaning and replacement, denies splinting\par Stress urinary incontinence: denies\par Overactive bladder syndrome: Reports urinating approx every 2 hours. Reports urgency. Reports occasional leakage of urine. Denies enuresis. H/o cataract surgery, denies glaucoma, not bothersome\par Voiding dysfunction: Reports incomplete bladder emptying and hesitancy \par Lower urinary tract/vaginal symptoms: 0 UTIs per year. Denies hematuria, dysuria, bladder pain \par Fecal incontinence: Denies \par Defecatory dysfunction: Type I, taking 2 tablespoons of benefiber daily and now softer \par Sexual dysfunction: not active\par Pelvic pain: denies\par Vaginal dryness: denies, hx of DCIS s/p bilateral mastectomy \par \par Her pelvic floor symptoms are significantly bothersome and negatively impacting her quality of life. \par \par

## 2021-12-13 NOTE — PHYSICAL EXAM
[Chaperone Present] : A chaperone was present in the examining room during all aspects of the physical examination [FreeTextEntry1] : Void: 100cc\par PVR:  15cc\par Urethra was prepped in sterile fashion and then a sterile catheter was used by me to drain the bladder.\par \par Aa:-1  Ba:-1 \par  \par Well healed incision: abdominoplasty\par Normal perineal sensation\par Normal perineal reflexes\par Negative cough stress test\par Positive atrophy\par Positive urethral caruncle\par (ring & support pessary #4, removed immediately prior to exam, cleaned, and replaced)\par No urethral tenderness\par No bladder tenderness\par No cervical tenderness\par 1/5 Kegel\par

## 2021-12-13 NOTE — DISCUSSION/SUMMARY
[FreeTextEntry1] : \par Midline cystocele-\par Ring with support #4 removed, cleaned, inspected and reinserted. The patient tolerated this well. \par No bleeding, lesions, abnormal discharge were noted. \par The patient will follow up in 3 months for routine pessary management.\par \par Urinary hesitancy-\par Will send the urine for testing to rule out infectious etiology.\par

## 2021-12-13 NOTE — COUNSELING
[FreeTextEntry1] : \par We will notify you of the urine results\par \par Please call the office if you have any issues with vaginal pain, vaginal bleeding, difficulty urinating or having a bowel movement or if the pessary falls out so that we can arrange another size pessary.\par \par Please schedule a 3 month pessary check with my PASara\par \par Happy Holidays!

## 2021-12-15 LAB
APPEARANCE: CLEAR
BACTERIA UR CULT: NORMAL
BILIRUBIN URINE: NEGATIVE
BLOOD URINE: NEGATIVE
COLOR: YELLOW
GLUCOSE QUALITATIVE U: NEGATIVE
KETONES URINE: NEGATIVE
LEUKOCYTE ESTERASE URINE: NEGATIVE
NITRITE URINE: NEGATIVE
PH URINE: 6
PROTEIN URINE: NORMAL
SPECIFIC GRAVITY URINE: 1.03
UROBILINOGEN URINE: ABNORMAL

## 2022-01-15 ENCOUNTER — APPOINTMENT (OUTPATIENT)
Dept: OBGYN | Facility: CLINIC | Age: 77
End: 2022-01-15

## 2022-02-19 ENCOUNTER — LABORATORY RESULT (OUTPATIENT)
Age: 77
End: 2022-02-19

## 2022-02-27 ENCOUNTER — LABORATORY RESULT (OUTPATIENT)
Age: 77
End: 2022-02-27

## 2022-02-28 ENCOUNTER — APPOINTMENT (OUTPATIENT)
Dept: OBGYN | Facility: CLINIC | Age: 77
End: 2022-02-28
Payer: MEDICARE

## 2022-02-28 VITALS
HEART RATE: 68 BPM | OXYGEN SATURATION: 99 % | TEMPERATURE: 98 F | HEIGHT: 62 IN | BODY MASS INDEX: 23 KG/M2 | DIASTOLIC BLOOD PRESSURE: 70 MMHG | SYSTOLIC BLOOD PRESSURE: 120 MMHG | WEIGHT: 125 LBS

## 2022-02-28 LAB
BILIRUB UR QL STRIP: NEGATIVE
CLARITY UR: CLEAR
COLLECTION METHOD: NORMAL
GLUCOSE UR-MCNC: NEGATIVE
HCG UR QL: 0.2 EU/DL
HGB UR QL STRIP.AUTO: NEGATIVE
KETONES UR-MCNC: NEGATIVE
LEUKOCYTE ESTERASE UR QL STRIP: NEGATIVE
NITRITE UR QL STRIP: NEGATIVE
PH UR STRIP: 7
PROT UR STRIP-MCNC: NEGATIVE
SP GR UR STRIP: 1.02

## 2022-02-28 PROCEDURE — 99212 OFFICE O/P EST SF 10 MIN: CPT | Mod: 25

## 2022-02-28 NOTE — HISTORY OF PRESENT ILLNESS
[Ultrasound] : ultrasound [LMP unknown] : LMP unknown [Yes] : pregnancy [unknown] : Patient is unsure of the date of her LMP [postmenopausal] : postmenopausal [Y] : Patient is sexually active [TextBox_4] : Pt present for annual gyn exam and pessary cleaning [Mammogramdate] : 4/21 [BreastSonogramDate] : -0- [PapSmeardate] : 8/31/21 [BoneDensityDate] : 6/1/20 [ColonoscopyDate] : 10/21 [PGHxTotal] : 3 [[facility]] : 2 [[facility] [FreeTextEntry1] :  x 3 [TextBox_27] : TVS/ PELVIC US- 6/1/20

## 2022-03-17 ENCOUNTER — APPOINTMENT (OUTPATIENT)
Dept: UROGYNECOLOGY | Facility: CLINIC | Age: 77
End: 2022-03-17

## 2022-05-05 ENCOUNTER — APPOINTMENT (OUTPATIENT)
Dept: UROGYNECOLOGY | Facility: CLINIC | Age: 77
End: 2022-05-05
Payer: MEDICARE

## 2022-05-05 VITALS
WEIGHT: 123 LBS | HEIGHT: 62 IN | BODY MASS INDEX: 22.63 KG/M2 | HEART RATE: 63 BPM | SYSTOLIC BLOOD PRESSURE: 136 MMHG | DIASTOLIC BLOOD PRESSURE: 75 MMHG

## 2022-05-05 PROCEDURE — 99213 OFFICE O/P EST LOW 20 MIN: CPT

## 2022-05-05 NOTE — HISTORY OF PRESENT ILLNESS
[FreeTextEntry1] : Patient is here for routine pessary cleaning for midline cystocele. \par Last seen 12/13/21 as a new pt  for pessary maintenance and cleaning. ring and support # 4\par \par 10/8/21: pelvic sono: uterus 4cm, endometrial lining 1mm, simple R peritoneal cyst: 1.2cm\par s/p BSO (strong family history of cancer), s/p abdominoplasty, using pessary and having it cleaned with Dr Ruby.\par H/o cataract surgery, denies glaucoma\par hx of DCIS s/p bilateral mastectomy \par Aa:-1 Ba:-1 \par \par No History of Incomplete Bladder Emptying without reduction. PVR: 15cc\par \par Today, patient is doing well and has no complaints. Last pessary cleaning was with Dr Arroyo on 2/25/22\par

## 2022-05-05 NOTE — COUNSELING
[FreeTextEntry1] : Please call the office if you have any issues with vaginal pain, vaginal bleeding, difficulty urinating or having a bowel movement or if the pessary falls out so that we can arrange another size pessary.\par \par Please follow up for pessary maintenance in 3 months with MELLISSA Ruiz\par

## 2022-05-05 NOTE — DISCUSSION/SUMMARY
[FreeTextEntry1] : Midline Cystocele\par Ring and support # 4 removed, cleaned, inspected and reinserted. The patient tolerated this well. \par No bleeding, lesions, abnormal discharge were noted. \par The patient will follow up in 3 months for routine pessary management.\par \par \par

## 2022-09-06 ENCOUNTER — APPOINTMENT (OUTPATIENT)
Dept: UROGYNECOLOGY | Facility: CLINIC | Age: 77
End: 2022-09-06

## 2022-09-15 ENCOUNTER — APPOINTMENT (OUTPATIENT)
Dept: UROGYNECOLOGY | Facility: CLINIC | Age: 77
End: 2022-09-15

## 2022-09-15 VITALS
SYSTOLIC BLOOD PRESSURE: 141 MMHG | BODY MASS INDEX: 22.45 KG/M2 | WEIGHT: 122 LBS | DIASTOLIC BLOOD PRESSURE: 69 MMHG | HEART RATE: 59 BPM | HEIGHT: 62 IN

## 2022-09-15 PROCEDURE — 99214 OFFICE O/P EST MOD 30 MIN: CPT

## 2022-09-15 NOTE — HISTORY OF PRESENT ILLNESS
[FreeTextEntry1] : Patient is here for routine pessary cleaning for cystocele.\par Last seen 5/5/5022 for pessary cleaning. ring and support # 4\par \par 10/8/21: pelvic sono: uterus 4cm, endometrial lining 1mm, simple R peritoneal cyst: 1.2cm\par s/p BSO (strong family history of cancer), s/p abdominoplasty, using pessary and having it cleaned with Dr Ruby.\par H/o cataract surgery, denies glaucoma\par hx of DCIS s/p bilateral mastectomy \par Aa:-1 Ba:-1 \par \par No History of Incomplete Bladder Emptying without reduction. PVR: 15cc\par \par Today, patient is doing well and has no complaints. Will be going to see Dr. Ruby for pap smear/pessary maintenance in 3 months\par

## 2022-09-15 NOTE — DISCUSSION/SUMMARY
[FreeTextEntry1] : \par Cystocele:\par Ring and support #4 removed, cleaned, inspected and reinserted. The patient tolerated this well. \par Bleeding noted. Hemostasis obtained with monsels.\par The patient will follow up in 3 months for routine pessary management.\par \par \par

## 2022-09-15 NOTE — COUNSELING
[FreeTextEntry1] : Please call the office if you have any issues with vaginal pain, vaginal bleeding, difficulty urinating or having a bowel movement or if the pessary falls out so that we can arrange another size pessary.\par \par Please follow up for pessary maintenance in 3 months with MELLISSA Ruiz or MELLISSA Veras

## 2022-12-05 ENCOUNTER — APPOINTMENT (OUTPATIENT)
Dept: OBGYN | Facility: CLINIC | Age: 77
End: 2022-12-05

## 2022-12-05 VITALS
SYSTOLIC BLOOD PRESSURE: 112 MMHG | WEIGHT: 123 LBS | OXYGEN SATURATION: 98 % | TEMPERATURE: 98.6 F | DIASTOLIC BLOOD PRESSURE: 76 MMHG | HEIGHT: 62 IN | BODY MASS INDEX: 22.63 KG/M2 | HEART RATE: 87 BPM

## 2022-12-05 PROCEDURE — 81003 URINALYSIS AUTO W/O SCOPE: CPT | Mod: QW

## 2022-12-05 PROCEDURE — 99213 OFFICE O/P EST LOW 20 MIN: CPT

## 2022-12-05 NOTE — HISTORY OF PRESENT ILLNESS
[Patient reported PAP Smear was normal] : Patient reported PAP Smear was normal [Patient reported bone density results were normal] : Patient reported bone density results were normal [Patient reported colonoscopy was normal] : Patient reported colonoscopy was normal [HPV test offered] : HPV test offered [Y] : Positive pregnancy history [TextBox_4] : PT PRESENT FOR PESSARY CLEANING PT STATES HAS HISTORY OF ABNORMAL PAP IN THE PAST. PAP DONE TODAY  [Mammogramdate] : 4/2021 [PapSmeardate] : 2/2022 [BoneDensityDate] : 2020 [ColonoscopyDate] : 10/2021 [PGHxTotal] : 3 [[facility]] : 3 [[facility] Hospitaliving] : 3 [FreeTextEntry1] :

## 2022-12-05 NOTE — PHYSICAL EXAM
[Chaperone Present] : A chaperone was present in the examining room during all aspects of the physical examination [Labia Majora] : normal [Labia Minora] : normal [No Bleeding] : There was no active vaginal bleeding [Normal] : normal [Uterine Adnexae] : normal [Normal rectal exam] : was normal

## 2022-12-05 NOTE — PLAN
[FreeTextEntry1] : PAP\par UA C&S\par PT IS SEEING BREAST SPECIALIST \par PT SEEING UROGYNECOLOGIST AND ALTERNATING VISITS IN OUR OFFICE \par PT COUNSELED ON IMPORTANCE OF VITAMINS QD C/D/E/FISH OIL \par RTO 3M PESSARY CLEANING /PRN

## 2022-12-19 ENCOUNTER — LABORATORY RESULT (OUTPATIENT)
Age: 77
End: 2022-12-19

## 2023-02-08 ENCOUNTER — NON-APPOINTMENT (OUTPATIENT)
Age: 78
End: 2023-02-08

## 2023-02-12 LAB — BACTERIA UR CULT: ABNORMAL

## 2023-02-14 NOTE — ASU PATIENT PROFILE, ADULT - PSH
Yes
H/O bilateral breast implants    H/O bilateral oophorectomy    H/O blepharoplasty    H/O mastectomy  bilateral  History of surgery  RIGHT EYE CATARACT EXTRACTION WITH LENS IMPLANT

## 2023-02-15 ENCOUNTER — NON-APPOINTMENT (OUTPATIENT)
Age: 78
End: 2023-02-15

## 2023-02-16 ENCOUNTER — NON-APPOINTMENT (OUTPATIENT)
Age: 78
End: 2023-02-16

## 2023-02-16 ENCOUNTER — EMERGENCY (EMERGENCY)
Facility: HOSPITAL | Age: 78
LOS: 0 days | Discharge: HOME | End: 2023-02-17
Attending: EMERGENCY MEDICINE
Payer: MEDICARE

## 2023-02-16 VITALS
OXYGEN SATURATION: 99 % | SYSTOLIC BLOOD PRESSURE: 147 MMHG | TEMPERATURE: 99 F | RESPIRATION RATE: 16 BRPM | DIASTOLIC BLOOD PRESSURE: 70 MMHG | HEART RATE: 73 BPM

## 2023-02-16 VITALS
SYSTOLIC BLOOD PRESSURE: 144 MMHG | RESPIRATION RATE: 18 BRPM | TEMPERATURE: 98 F | DIASTOLIC BLOOD PRESSURE: 67 MMHG | OXYGEN SATURATION: 99 % | HEART RATE: 62 BPM

## 2023-02-16 DIAGNOSIS — Z98.890 OTHER SPECIFIED POSTPROCEDURAL STATES: Chronic | ICD-10-CM

## 2023-02-16 DIAGNOSIS — R07.2 PRECORDIAL PAIN: ICD-10-CM

## 2023-02-16 DIAGNOSIS — Z98.82 BREAST IMPLANT STATUS: Chronic | ICD-10-CM

## 2023-02-16 DIAGNOSIS — Z88.1 ALLERGY STATUS TO OTHER ANTIBIOTIC AGENTS STATUS: ICD-10-CM

## 2023-02-16 DIAGNOSIS — Z85.3 PERSONAL HISTORY OF MALIGNANT NEOPLASM OF BREAST: ICD-10-CM

## 2023-02-16 DIAGNOSIS — Z98.82 BREAST IMPLANT STATUS: ICD-10-CM

## 2023-02-16 DIAGNOSIS — I10 ESSENTIAL (PRIMARY) HYPERTENSION: ICD-10-CM

## 2023-02-16 DIAGNOSIS — Z90.13 ACQUIRED ABSENCE OF BILATERAL BREASTS AND NIPPLES: ICD-10-CM

## 2023-02-16 DIAGNOSIS — Z90.10 ACQUIRED ABSENCE OF UNSPECIFIED BREAST AND NIPPLE: Chronic | ICD-10-CM

## 2023-02-16 DIAGNOSIS — E78.00 PURE HYPERCHOLESTEROLEMIA, UNSPECIFIED: ICD-10-CM

## 2023-02-16 DIAGNOSIS — Z90.722 ACQUIRED ABSENCE OF OVARIES, BILATERAL: Chronic | ICD-10-CM

## 2023-02-16 LAB
ALBUMIN SERPL ELPH-MCNC: 4.3 G/DL — SIGNIFICANT CHANGE UP (ref 3.5–5.2)
ALP SERPL-CCNC: 77 U/L — SIGNIFICANT CHANGE UP (ref 30–115)
ALT FLD-CCNC: 16 U/L — SIGNIFICANT CHANGE UP (ref 0–41)
ANION GAP SERPL CALC-SCNC: 10 MMOL/L — SIGNIFICANT CHANGE UP (ref 7–14)
AST SERPL-CCNC: 21 U/L — SIGNIFICANT CHANGE UP (ref 0–41)
BASOPHILS # BLD AUTO: 0.03 K/UL — SIGNIFICANT CHANGE UP (ref 0–0.2)
BASOPHILS NFR BLD AUTO: 0.3 % — SIGNIFICANT CHANGE UP (ref 0–1)
BILIRUB SERPL-MCNC: 0.8 MG/DL — SIGNIFICANT CHANGE UP (ref 0.2–1.2)
BUN SERPL-MCNC: 22 MG/DL — HIGH (ref 10–20)
CALCIUM SERPL-MCNC: 9.8 MG/DL — SIGNIFICANT CHANGE UP (ref 8.4–10.5)
CHLORIDE SERPL-SCNC: 106 MMOL/L — SIGNIFICANT CHANGE UP (ref 98–110)
CO2 SERPL-SCNC: 26 MMOL/L — SIGNIFICANT CHANGE UP (ref 17–32)
CREAT SERPL-MCNC: 0.8 MG/DL — SIGNIFICANT CHANGE UP (ref 0.7–1.5)
EGFR: 76 ML/MIN/1.73M2 — SIGNIFICANT CHANGE UP
EOSINOPHIL # BLD AUTO: 0.29 K/UL — SIGNIFICANT CHANGE UP (ref 0–0.7)
EOSINOPHIL NFR BLD AUTO: 3 % — SIGNIFICANT CHANGE UP (ref 0–8)
GLUCOSE SERPL-MCNC: 105 MG/DL — HIGH (ref 70–99)
HCT VFR BLD CALC: 35.8 % — LOW (ref 37–47)
HGB BLD-MCNC: 11.5 G/DL — LOW (ref 12–16)
IMM GRANULOCYTES NFR BLD AUTO: 0.4 % — HIGH (ref 0.1–0.3)
LYMPHOCYTES # BLD AUTO: 1.49 K/UL — SIGNIFICANT CHANGE UP (ref 1.2–3.4)
LYMPHOCYTES # BLD AUTO: 15.6 % — LOW (ref 20.5–51.1)
MCHC RBC-ENTMCNC: 21.5 PG — LOW (ref 27–31)
MCHC RBC-ENTMCNC: 32.1 G/DL — SIGNIFICANT CHANGE UP (ref 32–37)
MCV RBC AUTO: 66.9 FL — LOW (ref 81–99)
MONOCYTES # BLD AUTO: 0.83 K/UL — HIGH (ref 0.1–0.6)
MONOCYTES NFR BLD AUTO: 8.7 % — SIGNIFICANT CHANGE UP (ref 1.7–9.3)
NEUTROPHILS # BLD AUTO: 6.87 K/UL — HIGH (ref 1.4–6.5)
NEUTROPHILS NFR BLD AUTO: 72 % — SIGNIFICANT CHANGE UP (ref 42.2–75.2)
NRBC # BLD: 0 /100 WBCS — SIGNIFICANT CHANGE UP (ref 0–0)
NT-PROBNP SERPL-SCNC: 98 PG/ML — SIGNIFICANT CHANGE UP (ref 0–300)
PLATELET # BLD AUTO: 291 K/UL — SIGNIFICANT CHANGE UP (ref 130–400)
POTASSIUM SERPL-MCNC: 4.4 MMOL/L — SIGNIFICANT CHANGE UP (ref 3.5–5)
POTASSIUM SERPL-SCNC: 4.4 MMOL/L — SIGNIFICANT CHANGE UP (ref 3.5–5)
PROT SERPL-MCNC: 6.4 G/DL — SIGNIFICANT CHANGE UP (ref 6–8)
RBC # BLD: 5.35 M/UL — SIGNIFICANT CHANGE UP (ref 4.2–5.4)
RBC # FLD: 14.6 % — HIGH (ref 11.5–14.5)
SODIUM SERPL-SCNC: 142 MMOL/L — SIGNIFICANT CHANGE UP (ref 135–146)
TROPONIN T SERPL-MCNC: <0.01 NG/ML — SIGNIFICANT CHANGE UP
WBC # BLD: 9.55 K/UL — SIGNIFICANT CHANGE UP (ref 4.8–10.8)
WBC # FLD AUTO: 9.55 K/UL — SIGNIFICANT CHANGE UP (ref 4.8–10.8)

## 2023-02-16 PROCEDURE — 93005 ELECTROCARDIOGRAM TRACING: CPT

## 2023-02-16 PROCEDURE — 93010 ELECTROCARDIOGRAM REPORT: CPT

## 2023-02-16 PROCEDURE — 71045 X-RAY EXAM CHEST 1 VIEW: CPT

## 2023-02-16 PROCEDURE — 71275 CT ANGIOGRAPHY CHEST: CPT | Mod: MA

## 2023-02-16 PROCEDURE — 99285 EMERGENCY DEPT VISIT HI MDM: CPT | Mod: 25

## 2023-02-16 PROCEDURE — 99285 EMERGENCY DEPT VISIT HI MDM: CPT | Mod: FS

## 2023-02-16 PROCEDURE — 80053 COMPREHEN METABOLIC PANEL: CPT

## 2023-02-16 PROCEDURE — 85025 COMPLETE CBC W/AUTO DIFF WBC: CPT

## 2023-02-16 PROCEDURE — 83880 ASSAY OF NATRIURETIC PEPTIDE: CPT

## 2023-02-16 PROCEDURE — 71275 CT ANGIOGRAPHY CHEST: CPT | Mod: 26,MA

## 2023-02-16 PROCEDURE — 71045 X-RAY EXAM CHEST 1 VIEW: CPT | Mod: 26

## 2023-02-16 PROCEDURE — 84484 ASSAY OF TROPONIN QUANT: CPT

## 2023-02-16 PROCEDURE — 36415 COLL VENOUS BLD VENIPUNCTURE: CPT

## 2023-02-16 NOTE — ED PROVIDER NOTE - ATTENDING APP SHARED VISIT CONTRIBUTION OF CARE
77 yr old f w/ a pmh significant for htn, hld, pe?, breast cancer s/p mastectomy who presents with chest pain. Pt states that today while at home, she developed substernal chest pain "that wrapped around her chest". Pt denies any sob, nausea, vomiting, fevers, chills or any other medical complaints.     VITAL SIGNS: I have reviewed nursing notes and confirm.  CONSTITUTIONAL: non-toxic, well appearing  SKIN: no rash, no petechiae.  EYES:  EOMI, pink conjunctiva, anicteric  ENT: tongue midline, no exudates, MMM  NECK: Supple; no meningismus, no JVD  CARD: RRR, no murmurs, equal radial pulses bilaterally 2+  RESP: CTAB, no respiratory distress  ABD: Soft, non-tender, non-distended, no peritoneal signs, no HSM, no CVA tenderness  EXT: Normal ROM x4. No edema. No calves tenderness  NEURO: Alert, oriented x3. CN2-12 intact, equal strength bilaterally, nl gait.    77 yr old f that presents with chest pain. spoke to Dr. Balderrama, pts cardiologist whom recommended trop x 2, if negative pt can be dced with cardiology follow. In addition, will also obtain CTPE, cxr, ekg, labs. reassess. dispo pending.

## 2023-02-16 NOTE — ED PROVIDER NOTE - PHYSICAL EXAMINATION
Physical Exam    Constitutional: No acute distress.   Eyes: Conjunctiva pink, Sclera clear, PERRLA, EOMI.  ENT: No sinus tenderness. No nasal discharge. No oropharyngeal erythema, edema, or exudates. Uvula midline.   Cardiovascular: Regular rate, regular rhythm. No noted murmurs rubs or gallops.  Respiratory: unlabored respiratory effort, clear to auscultation bilaterally no wheezing, rales or rhonchi  Gastrointestinal: Normal bowel sounds. soft, non distended, non-tender abdomen.   Musculoskeletal: supple neck, no midline tenderness. No joint or bony deformity.   Integumentary: warm, dry, no rash  Neurologic: awake, alert, cranial nerves II-XII grossly intact, extremities’ motor and sensory functions grossly intact  Psychiatric: appropriate mood, appropriate affect Physical Exam    Constitutional: No acute distress.   Eyes: Conjunctiva pink, Sclera clear, PERRLA, EOMI.  ENT: No sinus tenderness. No nasal discharge. No oropharyngeal erythema, edema, or exudates. Uvula midline.   Cardiovascular: Regular rate, regular rhythm. No noted murmurs rubs or gallops.  Respiratory: unlabored respiratory effort, clear to auscultation bilaterally no wheezing, rales or rhonchi  Gastrointestinal: Normal bowel sounds. soft, non distended, non-tender abdomen.   Musculoskeletal: supple neck, no midline tenderness. No joint or bony deformity.   Integumentary: warm, dry, no rash  Neurologic: awake, alert, cranial nerves II-XII grossly intact, extremities’ motor and sensory functions grossly intact

## 2023-02-16 NOTE — ED PROVIDER NOTE - CARE PROVIDER_API CALL
Alexey Siddiqui (MD)  Cardiovascular Disease; Interventional Cardiology  [address]  [place] [address]  Phone: [phone]  Fax: [phone]  Follow Up Time:

## 2023-02-16 NOTE — ED PROVIDER NOTE - NS ED ROS FT
Constitutional: (-) fever  Eyes/ENT: (-) blurry vision, (-) epistaxis  Cardiovascular: (+) chest pain, (-) syncope  Respiratory: (-) cough, (-) shortness of breath  Gastrointestinal: (-) vomiting, (-) diarrhea  Musculoskeletal: (-) neck pain, (-) back pain, (-) joint pain  Integumentary: (-) rash, (-) edema  Neurological: (-) headache, (-) altered mental status

## 2023-02-16 NOTE — ED PROVIDER NOTE - OBJECTIVE STATEMENT
77 year old female with a history of HTN, HLD, Possible PE no longer on AC, breast cancer s/p mastectomy who presents with chest pain. Patient developed chest pain at 3pm described as substernal intermittent both sharp and dull at times that "wraps around her chest." Pain is non exertional and cannot be reproduced. Denies fever, chills, shortness of breath, nausea, vomiting, diarrhea, dysuria, leg swelling, recent surgeries, recent travel.

## 2023-02-16 NOTE — ED PROVIDER NOTE - PATIENT PORTAL LINK FT
You can access the FollowMyHealth Patient Portal offered by [facility] by registering at the following website: [email or web address]. By joining [employer]’s FollowMyHealth portal, you will also be able to view your health information using other applications (apps) compatible with our system.

## 2023-02-16 NOTE — ED PROVIDER NOTE - PROGRESS NOTE DETAILS
ER: pt signed out to Dr. Cooney EP: Patient endorsed  to me by Dr. Shoemaker to follow-up CTA chest, troponin #2 and dispo.  CTA done, no PE found.  Will send second troponin at the appropriate time. EP: Troponin #2 is negative, results of all test were discussed with the patient, she was advised to follow-up with Dr. Siddiqui to schedule an  earlier stress test, strict return precautions given. Patient to be discharged from ED. Any available test results were discussed with patient and/or family. Verbal instructions given, including instructions to return to ED immediately for any new, worsening, or concerning symptoms. Patient endorsed understanding. Written discharge instructions additionally given, including follow-up plan.  Patient was given opportunity to ask questions. ER: pt signed out to Dr. Cooney.

## 2023-02-16 NOTE — ED ADULT NURSE NOTE - BREATHING, MLM
Spontaneous, unlabored and symmetrical
Acute deep vein thrombosis (DVT) of femoral vein of right lower extremity

## 2023-02-16 NOTE — ED PROVIDER NOTE - CLINICAL SUMMARY MEDICAL DECISION MAKING FREE TEXT BOX
Elderly female presented to the ED with chest pain.  EKG, chest x-ray and labs were submitted.  Patient had 2 sets of cardiac enzymes which were negative.  Chest x-ray is interpreted by me as negative for acute pathology.  Additionally CTA chest was ordered to rule out PE and it was negative as well.  Management was discussed with Dr. Siddiqui, patient's cardiologist, who is comfortable with discharging patient home after 2 negative troponins, patient was advised to call the office today to schedule an earlier stress test. Strict return precautions given.  Patient verbalized understanding is amenable with the plan.

## 2023-02-16 NOTE — ED PROVIDER NOTE - NSFOLLOWUPINSTRUCTIONS_ED_ALL_ED_FT
Please follow up with Dr. Siddiqui during next available appointment. Please return to the ED if you develop new or worsening symptoms of chest pain.     Our Emergency Department Referral Coordinators will be reaching out to you in the next 24-48 hours from 9:00am to 5:00pm with a follow up appointment. Please expect a phone call from the hospital in that time frame. If you do not receive a call or if you have any questions or concerns, you can reach them at [phone] or [phone].     Chest Pain    Chest pain can be caused by many different conditions which may or may not be dangerous. Causes include heartburn, lung infections, heart attack, blood clot in lungs, skin infections, strain or damage to muscle, cartilage, or bones, etc. Lab tests or other studies including an electrocardiogram (EKG) may have been performed to find the cause of your pain. Make sure to follow up with a cardiologist or as instructed by your health care professional.    SEEK IMMEDIATE MEDICAL CARE IF YOU HAVE THE FOLLOWING SYMPTOMS: worsening chest pain, coughing up blood, unexplained back/neck/jaw pain, severe abdominal pain, dizziness or lightheadedness, shortness of breath, sweaty or clammy skin, vomiting, or racing heart beat. These symptoms may represent a serious problem that is an emergency. Do not wait to see if the symptoms will go away. Get medical help right away. Call your local emergency services (911 in the [place] Do not drive yourself to the hospital.

## 2023-02-17 LAB — TROPONIN T SERPL-MCNC: <0.01 NG/ML — SIGNIFICANT CHANGE UP

## 2023-02-20 NOTE — ED ADULT NURSE NOTE - NS ED NURSE LEVEL OF CONSCIOUSNESS ORIENTATION
Oriented - self; Oriented - place; Oriented - time Graft Donor Site Dermal Sutures (Optional): 4-0 Vicryl

## 2023-02-24 ENCOUNTER — NON-APPOINTMENT (OUTPATIENT)
Age: 78
End: 2023-02-24

## 2023-02-24 ENCOUNTER — APPOINTMENT (OUTPATIENT)
Dept: UROGYNECOLOGY | Facility: CLINIC | Age: 78
End: 2023-02-24
Payer: MEDICARE

## 2023-02-24 VITALS
DIASTOLIC BLOOD PRESSURE: 66 MMHG | WEIGHT: 123 LBS | HEART RATE: 69 BPM | SYSTOLIC BLOOD PRESSURE: 109 MMHG | BODY MASS INDEX: 22.63 KG/M2 | HEIGHT: 62 IN

## 2023-02-24 PROCEDURE — 99214 OFFICE O/P EST MOD 30 MIN: CPT

## 2023-02-24 NOTE — DISCUSSION/SUMMARY
[FreeTextEntry1] : \par Hematuria: \par Urine culture obtained and UA obtained. Urine clear from cath specimen\par Will follow up.\par Will treat accordingly if necessary\par \par Cystocele:\par Ring and support #4 removed, cleaned, inspected and reinserted. The patient tolerated this well. \par Bleeding noted. Hemostasis obtained with monsels\par The patient will follow up in 3 months for routine pessary management.\par \par Postmenopausal bleeding\par Advised further workup with a pelvic ultrasound to make sure the uterine lining is thin. Advised the patient that without a workup there is always a risk of uterine cancer or precancer that is not being diagnosed. The patient voiced understanding and agrees to the workup.\par \par

## 2023-02-24 NOTE — HISTORY OF PRESENT ILLNESS
[FreeTextEntry1] : Patient is here for routine pessary cleaning for cystocele\par Last seen 9/15/2022 for pessary cleaning. Ring and support # 4\par \par \par 10/8/21: pelvic sono: uterus 4cm, endometrial lining 1mm, simple R peritoneal cyst: 1.2cm\par s/p BSO (strong family history of cancer), s/p abdominoplasty, using pessary and having it cleaned with Dr Ruby.\par H/o cataract surgery, denies glaucoma\par hx of DCIS s/p bilateral mastectomy \par Aa:-1 Ba:-1 \par \par No History of Incomplete Bladder Emptying without reduction. PVR: 15cc\par \par 2/8/2023 UCx > 100 K E. Coli, pansensitive, treated with macrobid \par \par Today, patient is doing well. She completed the course of macrobid last Wednesday and this past Wednesday she experienced slight burning with urination which has resolved. She saw blood in her underwear today and is unsure if it is from the urine or from the vagina. Blood was pink/red in color. \par

## 2023-02-24 NOTE — PHYSICAL EXAM
[Chaperone Present] : A chaperone was present in the examining room during all aspects of the physical examination [No Acute Distress] : in no acute distress [Well developed] : well developed [Well Nourished] : ~L well nourished [FreeTextEntry1] : Indication: Hematuria\par Urethra was prepped in sterile fashion and then a sterile non- indwelling catheter (14F) was used by me to drain the bladder. The patient tolerated the procedure well.\par cath: 80 cc (clear urine)

## 2023-02-24 NOTE — COUNSELING
[FreeTextEntry1] : Please call the office if you have any issues with vaginal pain, vaginal bleeding, difficulty urinating or having a bowel movement or if the pessary falls out so that we can arrange another size pessary.\par \par Please schedule a transvaginal ultrasound, call the office when you schedule the appointment so that we can see you to remove the pessary. \par \par We will contact you if the urine results are abnormal.\par \par Please follow up for pessary maintenance in 3 months with MELLISSA Veras or MELLISSA Ruiz\par

## 2023-02-26 LAB — URINE CULTURE <10: NORMAL

## 2023-03-14 ENCOUNTER — APPOINTMENT (OUTPATIENT)
Dept: UROGYNECOLOGY | Facility: CLINIC | Age: 78
End: 2023-03-14

## 2023-05-09 ENCOUNTER — APPOINTMENT (OUTPATIENT)
Dept: UROGYNECOLOGY | Facility: CLINIC | Age: 78
End: 2023-05-09
Payer: MEDICARE

## 2023-05-09 VITALS
HEART RATE: 74 BPM | HEIGHT: 62 IN | DIASTOLIC BLOOD PRESSURE: 57 MMHG | WEIGHT: 123 LBS | SYSTOLIC BLOOD PRESSURE: 96 MMHG | BODY MASS INDEX: 22.63 KG/M2

## 2023-05-09 PROCEDURE — 99213 OFFICE O/P EST LOW 20 MIN: CPT

## 2023-05-11 ENCOUNTER — NON-APPOINTMENT (OUTPATIENT)
Age: 78
End: 2023-05-11

## 2023-05-12 NOTE — HISTORY OF PRESENT ILLNESS
[FreeTextEntry1] : Patient is here for routine pessary cleaning for cystocele\par Last seen 2/24/2023 for pessary cleaning. Ring and support # 4\par \par 10/8/21: pelvic sono: uterus 4cm, endometrial lining 1mm, simple R peritoneal cyst: 1.2cm\par s/p BSO (strong family history of cancer), s/p abdominoplasty, using pessary and having it cleaned with Dr Ruby.\par H/o cataract surgery, denies glaucoma\par hx of DCIS s/p bilateral mastectomy \par Aa:-1 Ba:-1 \par \par No History of Incomplete Bladder Emptying without reduction. PVR: 15cc\par \par 2/8/2023 UCx > 100 K E. Coli, pansensitive, treated with macrobid \par \par Today, patient is doing well. Denies vaginal bleeding or discharge. Scheduled for TVUS for postmenopausal bleeding later today. \par

## 2023-05-12 NOTE — COUNSELING
[FreeTextEntry1] : Please call the office if you have any issues with vaginal pain, vaginal bleeding, difficulty urinating or having a bowel movement or if the pessary falls out so that we can arrange another size pessary.\par \par Please return for your scheduled appointment tomorrow.\par \par Please bring the pessary with you\par \par

## 2023-05-18 ENCOUNTER — APPOINTMENT (OUTPATIENT)
Dept: UROGYNECOLOGY | Facility: CLINIC | Age: 78
End: 2023-05-18
Payer: MEDICARE

## 2023-05-18 VITALS
HEART RATE: 67 BPM | DIASTOLIC BLOOD PRESSURE: 69 MMHG | SYSTOLIC BLOOD PRESSURE: 124 MMHG | WEIGHT: 123 LBS | HEIGHT: 62 IN | BODY MASS INDEX: 22.63 KG/M2

## 2023-05-18 PROCEDURE — 99213 OFFICE O/P EST LOW 20 MIN: CPT

## 2023-05-18 NOTE — HISTORY OF PRESENT ILLNESS
[FreeTextEntry1] : Patient is here for pessary insertion\par Last seen 5/9/2023 for pessary cleaning. Ring and support # 4\par \par 10/8/21: pelvic sono: uterus 4cm, endometrial lining 1mm, simple R peritoneal cyst: 1.2cm\par s/p BSO (strong family history of cancer), s/p abdominoplasty, using pessary and having it cleaned with Dr Ruby.\par H/o cataract surgery, denies glaucoma\par hx of DCIS s/p bilateral mastectomy \par Aa:-1 Ba:-1 \par \par No History of Incomplete Bladder Emptying without reduction. PVR: 15cc\par \par 2/8/2023 UCx > 100 K E. Coli, pansensitive, treated with macrobid \par \par 5/9/2023 TVUS normal-the uterine lining is thin 3mm, the ovaries are surgically absent, stable small simple adnexal cyst \par \par Patient is here for pessary reinsertion. Patient was uncomfortable after the pessary was removed for the ultrasound last visit and waited to come in until today. Denies vaginal bleeding or discharge.

## 2023-05-18 NOTE — DISCUSSION/SUMMARY
[FreeTextEntry1] : \par Cystocele:\par Ring and support #4 inserted. \par precautions reviewed\par The patient will follow up in 6 months for routine pessary management. (she will be going to her gyn for pap smear and exam in 3 months, appointment for gyn is already scheduled per patient)\par \par

## 2023-05-18 NOTE — COUNSELING
[FreeTextEntry1] : Please call the office if you have any issues with vaginal pain, vaginal bleeding, difficulty urinating or having a bowel movement or if the pessary falls out so that we can arrange another size pessary.\par \par Please follow up for pessary maintenance in 6 months with MELLISSA Veras\par

## 2023-06-06 ENCOUNTER — APPOINTMENT (OUTPATIENT)
Dept: OBGYN | Facility: CLINIC | Age: 78
End: 2023-06-06
Payer: MEDICARE

## 2023-06-06 VITALS
OXYGEN SATURATION: 98 % | SYSTOLIC BLOOD PRESSURE: 102 MMHG | DIASTOLIC BLOOD PRESSURE: 70 MMHG | BODY MASS INDEX: 21.44 KG/M2 | HEART RATE: 69 BPM | WEIGHT: 121 LBS | HEIGHT: 63 IN | TEMPERATURE: 98.2 F

## 2023-06-06 PROCEDURE — G0101: CPT

## 2023-06-06 PROCEDURE — A4561: CPT

## 2023-06-06 PROCEDURE — 81003 URINALYSIS AUTO W/O SCOPE: CPT | Mod: QW

## 2023-06-06 PROCEDURE — 99213 OFFICE O/P EST LOW 20 MIN: CPT | Mod: 25

## 2023-06-06 NOTE — PHYSICAL EXAM

## 2023-06-06 NOTE — HISTORY OF PRESENT ILLNESS
[LMP unknown] : LMP unknown [Yes] : pregnancy [unknown] : Patient is unsure of the date of her LMP [FreeTextEntry1] : PATIENT PRESENT FOR ANNUAL GYN EXAM/ PESSARY CLEANING [TextBox_4] : PATIENT PRESENT FOR WELL WOMAN EXAM + PESSARY CLEANING [PapSmeardate] : 12/05/22 [BoneDensityDate] : -0- [ColonoscopyDate] : 10/21/21 [PGxTotal] : 1 [[facility]] : [facility]

## 2023-06-06 NOTE — PROCEDURE
[Cervical Pap Smear] : cervical Pap smear [Liquid Base] : liquid base [GC & Chlamydia via Pap] : GC & Chlamydia via Pap [Tolerated Well] : the patient tolerated the procedure well [No Complications] : there were no complications [de-identified] : REMOVED AND CLEANING OF PESSARY

## 2023-09-01 ENCOUNTER — APPOINTMENT (OUTPATIENT)
Dept: OBGYN | Facility: CLINIC | Age: 78
End: 2023-09-01
Payer: MEDICARE

## 2023-09-01 VITALS
SYSTOLIC BLOOD PRESSURE: 100 MMHG | WEIGHT: 121 LBS | OXYGEN SATURATION: 99 % | HEIGHT: 63 IN | HEART RATE: 60 BPM | BODY MASS INDEX: 21.44 KG/M2 | DIASTOLIC BLOOD PRESSURE: 60 MMHG | TEMPERATURE: 98 F

## 2023-09-01 LAB
BILIRUB UR QL STRIP: NORMAL
CLARITY UR: CLEAR
COLLECTION METHOD: NORMAL
GLUCOSE UR-MCNC: NORMAL
HCG UR QL: 0.2 EU/DL
HGB UR QL STRIP.AUTO: NORMAL
KETONES UR-MCNC: NORMAL
LEUKOCYTE ESTERASE UR QL STRIP: NORMAL
NITRITE UR QL STRIP: NORMAL
PH UR STRIP: 6.5
PROT UR STRIP-MCNC: NORMAL
SP GR UR STRIP: 1.02

## 2023-09-01 PROCEDURE — 81003 URINALYSIS AUTO W/O SCOPE: CPT | Mod: QW

## 2023-09-01 PROCEDURE — 57160 INSERT PESSARY/OTHER DEVICE: CPT

## 2023-09-01 PROCEDURE — A4561: CPT

## 2023-09-01 NOTE — HISTORY OF PRESENT ILLNESS
[FreeTextEntry1] : PT PRESENT FOR FOLLOW UP VISIT / PT COMPLAINING OF DISCHARGE AND SPOTTING  [TextBox_4] : PATIENT PRESENT C/O VAGINAL DISCHARGE [Mammogramdate] : 2022 [PapSmeardate] : 6/6/2023 [ColonoscopyDate] : 6/1/2023

## 2023-11-02 ENCOUNTER — APPOINTMENT (OUTPATIENT)
Dept: UROGYNECOLOGY | Facility: CLINIC | Age: 78
End: 2023-11-02
Payer: MEDICARE

## 2023-11-02 VITALS
WEIGHT: 121 LBS | HEART RATE: 74 BPM | DIASTOLIC BLOOD PRESSURE: 57 MMHG | HEIGHT: 63 IN | SYSTOLIC BLOOD PRESSURE: 93 MMHG | BODY MASS INDEX: 21.44 KG/M2

## 2023-11-02 DIAGNOSIS — Z87.448 PERSONAL HISTORY OF OTHER DISEASES OF URINARY SYSTEM: ICD-10-CM

## 2023-11-02 DIAGNOSIS — Z87.42 PERSONAL HISTORY OF OTHER DISEASES OF THE FEMALE GENITAL TRACT: ICD-10-CM

## 2023-11-02 DIAGNOSIS — N76.0 ACUTE VAGINITIS: ICD-10-CM

## 2023-11-02 DIAGNOSIS — B96.89 ACUTE VAGINITIS: ICD-10-CM

## 2023-11-02 DIAGNOSIS — B37.9 CANDIDIASIS, UNSPECIFIED: ICD-10-CM

## 2023-11-02 DIAGNOSIS — N83.209 UNSPECIFIED OVARIAN CYST, UNSPECIFIED SIDE: ICD-10-CM

## 2023-11-02 DIAGNOSIS — N39.0 URINARY TRACT INFECTION, SITE NOT SPECIFIED: ICD-10-CM

## 2023-11-02 DIAGNOSIS — Z01.419 ENCOUNTER FOR GYNECOLOGICAL EXAMINATION (GENERAL) (ROUTINE) W/OUT ABNORMAL FINDINGS: ICD-10-CM

## 2023-11-02 DIAGNOSIS — Z87.898 PERSONAL HISTORY OF OTHER SPECIFIED CONDITIONS: ICD-10-CM

## 2023-11-02 DIAGNOSIS — R31.9 URINARY TRACT INFECTION, SITE NOT SPECIFIED: ICD-10-CM

## 2023-11-02 PROCEDURE — 99215 OFFICE O/P EST HI 40 MIN: CPT

## 2023-11-02 RX ORDER — METRONIDAZOLE 7.5 MG/G
0.75 GEL VAGINAL
Qty: 1 | Refills: 0 | Status: COMPLETED | COMMUNITY
Start: 2023-09-01 | End: 2023-11-02

## 2023-11-02 RX ORDER — METRONIDAZOLE 7.5 MG/G
0.75 GEL VAGINAL
Qty: 1 | Refills: 1 | Status: COMPLETED | COMMUNITY
Start: 2023-09-01 | End: 2023-11-02

## 2023-11-02 RX ORDER — FLUCONAZOLE 150 MG/1
150 TABLET ORAL
Qty: 1 | Refills: 0 | Status: COMPLETED | COMMUNITY
Start: 2023-09-01 | End: 2023-11-02

## 2023-11-02 RX ORDER — NITROFURANTOIN MACROCRYSTALS 100 MG/1
100 CAPSULE ORAL
Qty: 14 | Refills: 0 | Status: COMPLETED | COMMUNITY
Start: 2023-09-05 | End: 2023-11-02

## 2023-11-02 RX ORDER — NITROFURANTOIN (MONOHYDRATE/MACROCRYSTALS) 25; 75 MG/1; MG/1
100 CAPSULE ORAL
Qty: 14 | Refills: 0 | Status: COMPLETED | COMMUNITY
Start: 2023-02-09 | End: 2023-11-02

## 2023-11-02 RX ORDER — SIMVASTATIN 10 MG/1
10 TABLET, FILM COATED ORAL
Qty: 90 | Refills: 0 | Status: COMPLETED | COMMUNITY
Start: 2021-12-06 | End: 2023-11-02

## 2024-02-15 ENCOUNTER — APPOINTMENT (OUTPATIENT)
Dept: OBGYN | Facility: CLINIC | Age: 79
End: 2024-02-15
Payer: MEDICARE

## 2024-02-15 VITALS — BODY MASS INDEX: 22.14 KG/M2 | DIASTOLIC BLOOD PRESSURE: 78 MMHG | WEIGHT: 125 LBS | SYSTOLIC BLOOD PRESSURE: 130 MMHG

## 2024-02-15 DIAGNOSIS — N95.0 POSTMENOPAUSAL BLEEDING: ICD-10-CM

## 2024-02-15 PROCEDURE — 99213 OFFICE O/P EST LOW 20 MIN: CPT

## 2024-02-15 NOTE — PLAN
[FreeTextEntry1] : Patient counselled that the likely source of her bleeding is from the pessary. However, I cannot r/o uterine bleeding. I counselled her that PMB may be a sign of endometrial cancer and the only way to rule it out is a D&C or a biopsy. Patient would like to avoid a biopsy or a d&c.   Therefore, I am recommending a TV/TA sono. I counselled patient that if the endometrial thickness is under 5mm, the risk of an underlying endometrial cancer will be 1-2%.  I removed her pessary and gave it to her as per Dr. Buck's rquest.  F/U 2 weeks for telehealth to discuss results and further mgmt.

## 2024-02-15 NOTE — PHYSICAL EXAM
[Chaperone Present] : A chaperone was present in the examining room during all aspects of the physical examination [Labia Majora] : normal [Labia Minora] : normal [Atrophy] : atrophy [Cystocele] : a cystocele [Scant] : There was scant vaginal bleeding [Normal] : normal [Uterine Adnexae] : normal [FreeTextEntry4] : Ring with support pesssary removed. There is slight mucosal bleeding - irritation from the pessary.

## 2024-02-15 NOTE — HISTORY OF PRESENT ILLNESS
[FreeTextEntry1] : 79 yo , LMP at 50 years, here today for consultation.  Patient was referred by Dr. Buck, who she sees for cystocele and pessary maintenance.  She reports 2 months ago, one episode of spotting after a spin class. She was told by Dr. Ruby she needed a D&C and is here for a second opinion. She states she has not had bleeding since.  Patient reports increased vaginal discharge, slightly yellow in color. Denies malodor, itching burning.  Denies pelvic pain or urinary symptoms.   Last Annual: 2023 Last PAP: 2023 Last mammo: Dec 2023 Last DEXA: Unsure Last colonscopy: May 2023    GynHx: Age of menarche: 11 years old  Length of cycles/bleeding: Regular  Denies hx of abnormal PAP, STIs, fibroids, or ovarian cysts.   Sexual Hx: Not sexually active.    GYN Sx: Bilateral oophorectomy 22 years ago  Surgical Hx: tummy tuck   OB Hx:  FT  x3   PMH: HTN, HLD   Meds: Losartan, Rosuvastatin   All: Cipro, Pantoprazole PRN  Social Hx: Nonsmoker,   Family Hx: Mother and sisters: breast cancer Sister: ovarian or uterine cancer - unsure

## 2024-02-29 ENCOUNTER — APPOINTMENT (OUTPATIENT)
Dept: UROGYNECOLOGY | Facility: CLINIC | Age: 79
End: 2024-02-29
Payer: MEDICARE

## 2024-02-29 ENCOUNTER — APPOINTMENT (OUTPATIENT)
Dept: OBGYN | Facility: CLINIC | Age: 79
End: 2024-02-29

## 2024-02-29 VITALS
HEIGHT: 63 IN | WEIGHT: 125 LBS | BODY MASS INDEX: 22.15 KG/M2 | HEART RATE: 71 BPM | DIASTOLIC BLOOD PRESSURE: 69 MMHG | SYSTOLIC BLOOD PRESSURE: 122 MMHG

## 2024-02-29 DIAGNOSIS — Z46.89 ENCOUNTER FOR FITTING AND ADJUSTMENT OF OTHER SPECIFIED DEVICES: ICD-10-CM

## 2024-02-29 DIAGNOSIS — N81.11 CYSTOCELE, MIDLINE: ICD-10-CM

## 2024-02-29 PROCEDURE — 99214 OFFICE O/P EST MOD 30 MIN: CPT

## 2024-02-29 NOTE — PHYSICAL EXAM
[FreeTextEntry1] :  Aa Ba 0 +urethral caruncle Speculum: +atrophy, no active bleeding [Chaperone Present] : A chaperone was present in the examining room during all aspects of the physical examination

## 2024-02-29 NOTE — COUNSELING
[FreeTextEntry1] :  Please call the office if you have any issues with vaginal pain, vaginal bleeding, difficulty urinating or having a bowel movement or if the pessary falls out so that we can arrange another size pessary.  Please schedule a 3 month pessary check with Dr Buck

## 2024-02-29 NOTE — DISCUSSION/SUMMARY
[FreeTextEntry1] :  Cystocele- Ring with support #4 replaced. Remained in place and was comfortable. Precautions provided Will return in 3 months for followup or earlier if she has any issues

## 2024-02-29 NOTE — REASON FOR VISIT
[TextEntry] :  Reason for visit: Follow up pessary maintenance.  Voids per day: 10-15  Voids per night: 1  Urge incontinence: No  Stress incontinence: No  Constipation: No  Fecal incontinence: No  Vaginal bulge: Yes

## 2024-02-29 NOTE — HISTORY OF PRESENT ILLNESS
[FreeTextEntry1] :  Patient is here for followup for cystocele Last seen on 11/2/2023 for pessary check Ring with support #4 Not using vaginal estrogen cream because of family history s/p BS0 s/p abdominoplasty  History of postmenopausal spotting Wanted a second opinion in regards to management Seen by Dr Timmons on 2/15/2024 The plan was to undergo a pelvic sono and if the uterine lining is less than 5mm, then the risk of underlying endometrial cancer will be 1-2% He removed the pessary so that she could undergo the ultrasound I advised the patient today of the ultrasound findings  2/16/2024: ultrasound: uterus 3.2cm, endometrial lining 1mm, surgically absent ovaries  Today, the patient reports that she wants the pessary replaced because she is bothered by the prolapse

## 2024-04-11 ENCOUNTER — APPOINTMENT (OUTPATIENT)
Dept: CARDIOLOGY | Facility: CLINIC | Age: 79
End: 2024-04-11
Payer: MEDICARE

## 2024-04-11 VITALS
SYSTOLIC BLOOD PRESSURE: 116 MMHG | BODY MASS INDEX: 21.26 KG/M2 | HEART RATE: 66 BPM | WEIGHT: 120 LBS | DIASTOLIC BLOOD PRESSURE: 70 MMHG | HEIGHT: 63 IN

## 2024-04-11 DIAGNOSIS — E78.5 HYPERLIPIDEMIA, UNSPECIFIED: ICD-10-CM

## 2024-04-11 DIAGNOSIS — Z82.49 FAMILY HISTORY OF ISCHEMIC HEART DISEASE AND OTHER DISEASES OF THE CIRCULATORY SYSTEM: ICD-10-CM

## 2024-04-11 DIAGNOSIS — Z13.6 ENCOUNTER FOR SCREENING FOR CARDIOVASCULAR DISORDERS: ICD-10-CM

## 2024-04-11 DIAGNOSIS — I10 ESSENTIAL (PRIMARY) HYPERTENSION: ICD-10-CM

## 2024-04-11 PROCEDURE — 99214 OFFICE O/P EST MOD 30 MIN: CPT

## 2024-04-11 PROCEDURE — 93000 ELECTROCARDIOGRAM COMPLETE: CPT

## 2024-04-11 RX ORDER — PANTOPRAZOLE 20 MG/1
20 TABLET, DELAYED RELEASE ORAL
Refills: 0 | Status: DISCONTINUED | COMMUNITY
End: 2024-04-11

## 2024-04-11 RX ORDER — LOSARTAN POTASSIUM AND HYDROCHLOROTHIAZIDE 12.5; 5 MG/1; MG/1
50-12.5 TABLET ORAL
Qty: 90 | Refills: 3 | Status: ACTIVE | COMMUNITY
Start: 2021-12-06 | End: 1900-01-01

## 2024-04-11 RX ORDER — PANTOPRAZOLE 20 MG/1
20 TABLET, DELAYED RELEASE ORAL
Qty: 90 | Refills: 3 | Status: ACTIVE | COMMUNITY

## 2024-04-11 RX ORDER — ROSUVASTATIN CALCIUM 20 MG/1
20 TABLET, FILM COATED ORAL DAILY
Qty: 90 | Refills: 3 | Status: ACTIVE | COMMUNITY
Start: 1900-01-01 | End: 1900-01-01

## 2024-04-11 NOTE — PHYSICAL EXAM
[Well Developed] : well developed [No Acute Distress] : no acute distress [Normal Conjunctiva] : normal conjunctiva [No Carotid Bruit] : no carotid bruit [Normal S1, S2] : normal S1, S2 [No Murmur] : no murmur [No Rub] : no rub [Clear Lung Fields] : clear lung fields [Good Air Entry] : good air entry [No Respiratory Distress] : no respiratory distress  [Soft] : abdomen soft [Non Tender] : non-tender [Normal Gait] : normal gait [No Edema] : no edema [No Cyanosis] : no cyanosis [No Rash] : no rash [No Skin Lesions] : no skin lesions [Moves all extremities] : moves all extremities [No Focal Deficits] : no focal deficits [Alert and Oriented] : alert and oriented

## 2024-04-11 NOTE — DISCUSSION/SUMMARY
[FreeTextEntry1] : BP is controlled.   Get reports from Artur's office Screening carotid duplex and abdominal US if needed Will call to discuss results

## 2024-04-11 NOTE — HISTORY OF PRESENT ILLNESS
FreeTextEntry1] : Former patient of Dr. Siddiqui.  78 F with HTN, HLD. No cardiac history.  Cardiac testing in May 2023 reportedly unremarkable.  No CP, SOB, palpitations, orthopnea/PND, dizziness or syncope

## 2024-04-16 ENCOUNTER — APPOINTMENT (OUTPATIENT)
Dept: OBGYN | Facility: CLINIC | Age: 79
End: 2024-04-16

## 2024-04-26 LAB
APPEARANCE: CLEAR
BILIRUB UR QL STRIP: NEGATIVE
BILIRUBIN URINE: NEGATIVE
BLOOD URINE: NEGATIVE
CLARITY UR: NORMAL
COLLECTION METHOD: NORMAL
COLOR: YELLOW
CYTOLOGY CVX/VAG DOC THIN PREP: ABNORMAL
CYTOLOGY CVX/VAG DOC THIN PREP: NORMAL
GLUCOSE QUALITATIVE U: NEGATIVE
GLUCOSE UR-MCNC: NEGATIVE
HCG UR QL: 0.2 EU/DL
HGB UR QL STRIP.AUTO: NEGATIVE
HPV HIGH+LOW RISK DNA PNL CVX: NOT DETECTED
HPV HIGH+LOW RISK DNA PNL CVX: NOT DETECTED
KETONES UR-MCNC: NEGATIVE
KETONES URINE: NEGATIVE
LEUKOCYTE ESTERASE UR QL STRIP: NORMAL
LEUKOCYTE ESTERASE URINE: ABNORMAL
NITRITE UR QL STRIP: NEGATIVE
NITRITE URINE: NEGATIVE
PH UR STRIP: 7
PH URINE: 5.5
PROT UR STRIP-MCNC: NEGATIVE
PROTEIN URINE: ABNORMAL
SP GR UR STRIP: 1.02
SPECIFIC GRAVITY URINE: 1.03
UROBILINOGEN URINE: ABNORMAL

## 2024-04-29 ENCOUNTER — APPOINTMENT (OUTPATIENT)
Dept: OBGYN | Facility: CLINIC | Age: 79
End: 2024-04-29
Payer: MEDICARE

## 2024-04-29 VITALS
SYSTOLIC BLOOD PRESSURE: 108 MMHG | HEIGHT: 63 IN | DIASTOLIC BLOOD PRESSURE: 68 MMHG | OXYGEN SATURATION: 99 % | TEMPERATURE: 97.8 F | BODY MASS INDEX: 21.44 KG/M2 | HEART RATE: 78 BPM | WEIGHT: 121 LBS

## 2024-04-29 DIAGNOSIS — K57.32 DIVERTICULITIS OF LARGE INTESTINE W/OUT PERFORATION OR ABSCESS W/OUT BLEEDING: ICD-10-CM

## 2024-04-29 DIAGNOSIS — R87.619 UNSPECIFIED ABNORMAL CYTOLOGICAL FINDINGS IN SPECIMENS FROM CERVIX UTERI: ICD-10-CM

## 2024-04-29 DIAGNOSIS — B96.89 ACUTE VAGINITIS: ICD-10-CM

## 2024-04-29 DIAGNOSIS — N76.0 ACUTE VAGINITIS: ICD-10-CM

## 2024-04-29 LAB
BILIRUB UR QL STRIP: NORMAL
CLARITY UR: CLEAR
COLLECTION METHOD: NORMAL
GLUCOSE UR-MCNC: NORMAL
HCG UR QL: 0.2 EU/DL
HGB UR QL STRIP.AUTO: NORMAL
KETONES UR-MCNC: NORMAL
LEUKOCYTE ESTERASE UR QL STRIP: NORMAL
NITRITE UR QL STRIP: NORMAL
PH UR STRIP: 6.5
PROT UR STRIP-MCNC: NORMAL
SP GR UR STRIP: 1.03

## 2024-04-29 PROCEDURE — 99459 PELVIC EXAMINATION: CPT

## 2024-04-29 PROCEDURE — 81003 URINALYSIS AUTO W/O SCOPE: CPT | Mod: QW

## 2024-04-29 PROCEDURE — 99212 OFFICE O/P EST SF 10 MIN: CPT

## 2024-04-29 RX ORDER — METRONIDAZOLE 7.5 MG/G
0.75 GEL VAGINAL
Qty: 1 | Refills: 1 | Status: ACTIVE | COMMUNITY
Start: 2024-04-29 | End: 1900-01-01

## 2024-04-29 RX ORDER — AZITHROMYCIN 250 MG/1
250 TABLET, FILM COATED ORAL
Qty: 1 | Refills: 0 | Status: ACTIVE | COMMUNITY
Start: 2024-04-29 | End: 1900-01-01

## 2024-04-29 NOTE — PROCEDURE
[Cervical Pap Smear] : cervical Pap smear [Liquid Base] : liquid base [GC & Chlamydia via Pap] : GC & Chlamydia via Pap [Tolerated Well] : the patient tolerated the procedure well [No Complications] : there were no complications [de-identified] : HPV DONE

## 2024-04-29 NOTE — PHYSICAL EXAM
[Chaperone Present] : A chaperone was present in the examining room during all aspects of the physical examination [72367] : A chaperone was present during the pelvic exam. [FreeTextEntry2] : HARVEY MILLS MA/ AUDELIA [Appropriately responsive] : appropriately responsive [Alert] : alert [No Acute Distress] : no acute distress [No Lymphadenopathy] : no lymphadenopathy [Regular Rate Rhythm] : regular rate rhythm [No Murmurs] : no murmurs [Clear to Auscultation B/L] : clear to auscultation bilaterally [Soft] : soft [Non-tender] : non-tender [Non-distended] : non-distended [No HSM] : No HSM [No Lesions] : no lesions [No Mass] : no mass [Oriented x3] : oriented x3 [Examination Of The Breasts] : a normal appearance [No Masses] : no breast masses were palpable [Labia Majora] : normal [Labia Minora] : normal [Normal] : normal [Uterine Adnexae] : normal

## 2024-04-29 NOTE — HISTORY OF PRESENT ILLNESS
[FreeTextEntry1] : PATIENT PRESENT FOR PESSARY CLEANING AND REPEAT PAP [LMP unknown] : LMP unknown [TextBox_4] : PATIENT PRESENT FOR PESSARY CLEANING [PGHxTotal] : 3 [[facility] [Yes] : pregnancy [unknown] : Patient is unsure of the date of her LMP

## 2024-05-01 LAB — HPV HIGH+LOW RISK DNA PNL CVX: NOT DETECTED

## 2024-05-03 LAB — CYTOLOGY CVX/VAG DOC THIN PREP: ABNORMAL

## 2024-05-17 ENCOUNTER — APPOINTMENT (OUTPATIENT)
Dept: OBGYN | Facility: CLINIC | Age: 79
End: 2024-05-17

## 2024-05-19 LAB
APPEARANCE: CLEAR
BACTERIA UR CULT: NORMAL
BACTERIA: NEGATIVE /HPF
BILIRUBIN URINE: NEGATIVE
BLOOD URINE: NEGATIVE
CALCIUM OXALATE CRYSTALS: PRESENT
CAST: 1 /LPF
COLOR: NORMAL
EPITHELIAL CELLS: 4 /HPF
GLUCOSE QUALITATIVE U: NEGATIVE MG/DL
HYALINE CASTS: PRESENT
KETONES URINE: NEGATIVE MG/DL
LEUKOCYTE ESTERASE URINE: ABNORMAL
MICROSCOPIC-UA: NORMAL
MUCUS: PRESENT
NITRITE URINE: POSITIVE
PH URINE: 5.5
PROTEIN URINE: NEGATIVE MG/DL
RED BLOOD CELLS URINE: 5 /HPF
REVIEW: NORMAL
SPECIFIC GRAVITY URINE: 1.02
UROBILINOGEN URINE: 0.2 MG/DL
WHITE BLOOD CELLS URINE: 9 /HPF

## 2024-05-20 DIAGNOSIS — R31.9 HEMATURIA, UNSPECIFIED: ICD-10-CM

## 2024-05-20 DIAGNOSIS — Z13.0 ENCOUNTER FOR SCREENING FOR DISEASES OF THE BLOOD AND BLOOD-FORMING ORGANS AND CERTAIN DISORDERS INVOLVING THE IMMUNE MECHANISM: ICD-10-CM

## 2024-05-20 DIAGNOSIS — Z34.90 ENCOUNTER FOR SUPERVISION OF NORMAL PREGNANCY, UNSPECIFIED, UNSPECIFIED TRIMESTER: ICD-10-CM

## 2024-05-20 DIAGNOSIS — Z13.89 ENCOUNTER FOR SCREENING FOR OTHER DISORDER: ICD-10-CM

## 2024-05-20 DIAGNOSIS — Z13.1 ENCOUNTER FOR SCREENING FOR DIABETES MELLITUS: ICD-10-CM

## 2024-07-09 ENCOUNTER — NON-APPOINTMENT (OUTPATIENT)
Age: 79
End: 2024-07-09

## 2024-07-23 ENCOUNTER — APPOINTMENT (OUTPATIENT)
Dept: GYNECOLOGIC ONCOLOGY | Facility: CLINIC | Age: 79
End: 2024-07-23
Payer: MEDICARE

## 2024-07-23 DIAGNOSIS — N81.3 COMPLETE UTEROVAGINAL PROLAPSE: ICD-10-CM

## 2024-07-23 PROCEDURE — 99459 PELVIC EXAMINATION: CPT

## 2024-07-23 PROCEDURE — 99204 OFFICE O/P NEW MOD 45 MIN: CPT

## 2024-07-28 PROBLEM — N81.3 UTEROVAGINAL PROLAPSE, COMPLETE: Status: ACTIVE | Noted: 2024-07-28

## 2024-07-28 NOTE — HISTORY OF PRESENT ILLNESS
[FreeTextEntry1] : 78 year old, ,  x 3, LMP at age 50.  Patient with hx of cystocele and pessary, followed by Dr. Buck.   Most recent Pap 24 Negative Intraepithelial Lesion or Malignancy HPV neg.  CT abdomen and pelvis with contrast 24 Impression: Pelvic Organs: The uterus is retroverted. Ovaries are not seen. There is a vaginal pessary. 1. There is a subtle sigmoid diverticulitis. Minimal inflammation surrounding a single diverticulum. Moderate sigmoid diverticulosis. Markedly redundant large bowel. 2. The loops of distal small bowel are much smaller caliber than the proximal small bowel loops but the proximal loops are not dilated. Oral contrast did not reach the distal small bowel up even after the patient was imaged with additional oral contrast and hour delay. Findings favoring small bowel ileus over small bowel obstruction. 3. Cholelithiasis without cholecystitis.  US Pelvis 24 Impression: Normal appearance of the uterus. Bilateral oophorectomy. Slight decrease in size of subcentimeter right adnexal cyst, likley benign.  MRI with and without contrast 24 Pelvic Organs: There is a vaginal pessary. The uterus is not enlarged. The adnexa are grossly unremarkable. Impression: Interval resolution of small bowel ileus. No bowel obstruction or thickening. Patient presents today for initial evaluation.

## 2024-07-28 NOTE — CHIEF COMPLAINT
[FreeTextEntry1] : former [facility] patient for f/u of uterovaginal prolapse

## 2024-07-28 NOTE — PHYSICAL EXAM
[Chaperone Present] : A chaperone was present in the examining room during all aspects of the physical examination [[id number]] : A chaperone was present during the pelvic exam. [FreeTextEntry2] : Donya Ortiz

## 2024-07-28 NOTE — ASSESSMENT
[FreeTextEntry1] : 78 year old, ,  x 3, LMP at age 50. Patient with hx of cystocele and pessary, followed by Dr. Buck. She is content with her outcome utilizing the pessary and is not interested in surgical repair.

## 2024-07-28 NOTE — PLAN
[TextEntry] : F/U visit in 6months. Surgical options reviewed however patient is content with pessary results.

## 2024-07-28 NOTE — PHYSICAL EXAM
[Chaperone Present] : A chaperone was present in the examining room during all aspects of the physical examination [[id number]] : A chaperone was present during the pelvic exam. [FreeTextEntry2] : Donya Ortiz

## 2024-07-28 NOTE — PHYSICAL EXAM
[Chaperone Present] : A chaperone was present in the examining room during all aspects of the physical examination [[id number]] : A chaperone was present during the pelvic exam. [FreeTextEntry2] : Donya Ortiz

## 2024-08-01 ENCOUNTER — APPOINTMENT (OUTPATIENT)
Dept: CARDIOLOGY | Facility: CLINIC | Age: 79
End: 2024-08-01
Payer: MEDICARE

## 2024-08-01 VITALS — SYSTOLIC BLOOD PRESSURE: 110 MMHG | DIASTOLIC BLOOD PRESSURE: 72 MMHG

## 2024-08-01 DIAGNOSIS — I10 ESSENTIAL (PRIMARY) HYPERTENSION: ICD-10-CM

## 2024-08-01 DIAGNOSIS — Z13.6 ENCOUNTER FOR SCREENING FOR CARDIOVASCULAR DISORDERS: ICD-10-CM

## 2024-08-01 DIAGNOSIS — E78.5 HYPERLIPIDEMIA, UNSPECIFIED: ICD-10-CM

## 2024-08-01 PROCEDURE — 99214 OFFICE O/P EST MOD 30 MIN: CPT

## 2024-08-01 PROCEDURE — G2211 COMPLEX E/M VISIT ADD ON: CPT

## 2024-08-01 PROCEDURE — 93000 ELECTROCARDIOGRAM COMPLETE: CPT

## 2024-08-01 NOTE — DISCUSSION/SUMMARY
[FreeTextEntry1] : BP is controlled.  Labs reviewed - Lipids at goal   Records from Dr. Siddiqui reviewed Screening Carotid duplex Will call to discuss results

## 2024-08-01 NOTE — HISTORY OF PRESENT ILLNESS
[FreeTextEntry1] : Former patient of Dr. Siddiqui.  78 F with HTN, HLD. No cardiac history.  Cardiac testing in May 2023 reportedly unremarkable.  No CP, SOB, palpitations, orthopnea/PND, dizziness or syncope.  Records reviewed. NST (3/2023):  No ischemia

## 2024-08-01 NOTE — PHYSICAL EXAM
[Well Developed] : well developed [No Acute Distress] : no acute distress [Normal Conjunctiva] : normal conjunctiva [No Carotid Bruit] : no carotid bruit [Normal S1, S2] : normal S1, S2 [No Murmur] : no murmur [Clear Lung Fields] : clear lung fields [Good Air Entry] : good air entry [No Respiratory Distress] : no respiratory distress  [Soft] : abdomen soft [Non Tender] : non-tender [Normal Gait] : normal gait [No Edema] : no edema [No Cyanosis] : no cyanosis [No Rash] : no rash [Moves all extremities] : moves all extremities [No Focal Deficits] : no focal deficits [Alert and Oriented] : alert and oriented

## 2024-08-13 ENCOUNTER — APPOINTMENT (OUTPATIENT)
Dept: OBGYN | Facility: CLINIC | Age: 79
End: 2024-08-13
Payer: MEDICARE

## 2024-08-13 VITALS
TEMPERATURE: 98 F | WEIGHT: 120 LBS | HEIGHT: 63 IN | BODY MASS INDEX: 21.26 KG/M2 | OXYGEN SATURATION: 98 % | DIASTOLIC BLOOD PRESSURE: 74 MMHG | HEART RATE: 80 BPM | SYSTOLIC BLOOD PRESSURE: 114 MMHG

## 2024-08-13 DIAGNOSIS — Z46.89 ENCOUNTER FOR FITTING AND ADJUSTMENT OF OTHER SPECIFIED DEVICES: ICD-10-CM

## 2024-08-13 PROCEDURE — 81003 URINALYSIS AUTO W/O SCOPE: CPT | Mod: QW

## 2024-08-13 PROCEDURE — 99459 PELVIC EXAMINATION: CPT

## 2024-08-13 PROCEDURE — 99213 OFFICE O/P EST LOW 20 MIN: CPT

## 2024-08-13 NOTE — HISTORY OF PRESENT ILLNESS
[FreeTextEntry1] : Pt present for pessary cleaning. Pt c/o palpable lump on left buttock.   I,  Yaima Finnegan CMA , personally scribed the services dictated  to me by for Dr Bruce Ruby DO in this documentation on this date  August 13,2024    for Christine Rosa  .  Yaima Finnegan CMA   [PapSmeardate] : 4/29/2024 [BoneDensityDate] : 2024

## 2024-08-13 NOTE — PHYSICAL EXAM
[Chaperone Present] : A chaperone was present in the examining room during all aspects of the physical examination [[id number]] : A chaperone was present during the pelvic exam. [FreeTextEntry2] : KATJA [Labia Majora] : normal [Labia Minora] : normal [Normal] : normal [Uterine Adnexae] : normal [Normal rectal exam] : was normal

## 2024-09-04 ENCOUNTER — APPOINTMENT (OUTPATIENT)
Dept: CARDIOLOGY | Facility: CLINIC | Age: 79
End: 2024-09-04

## 2024-09-04 ENCOUNTER — APPOINTMENT (OUTPATIENT)
Dept: CARDIOLOGY | Facility: CLINIC | Age: 79
End: 2024-09-04
Payer: MEDICARE

## 2024-09-04 PROCEDURE — 93880 EXTRACRANIAL BILAT STUDY: CPT

## 2024-10-15 ENCOUNTER — APPOINTMENT (OUTPATIENT)
Dept: UROGYNECOLOGY | Facility: CLINIC | Age: 79
End: 2024-10-15
Payer: MEDICARE

## 2024-10-15 VITALS
HEIGHT: 63 IN | SYSTOLIC BLOOD PRESSURE: 113 MMHG | HEART RATE: 74 BPM | DIASTOLIC BLOOD PRESSURE: 64 MMHG | BODY MASS INDEX: 21.44 KG/M2 | WEIGHT: 121 LBS

## 2024-10-15 DIAGNOSIS — Z13.1 ENCOUNTER FOR SCREENING FOR DIABETES MELLITUS: ICD-10-CM

## 2024-10-15 DIAGNOSIS — R31.9 HEMATURIA, UNSPECIFIED: ICD-10-CM

## 2024-10-15 DIAGNOSIS — N81.3 COMPLETE UTEROVAGINAL PROLAPSE: ICD-10-CM

## 2024-10-15 DIAGNOSIS — N81.11 CYSTOCELE, MIDLINE: ICD-10-CM

## 2024-10-15 DIAGNOSIS — B96.89 ACUTE VAGINITIS: ICD-10-CM

## 2024-10-15 DIAGNOSIS — N76.0 ACUTE VAGINITIS: ICD-10-CM

## 2024-10-15 DIAGNOSIS — Z13.89 ENCOUNTER FOR SCREENING FOR OTHER DISORDER: ICD-10-CM

## 2024-10-15 DIAGNOSIS — Z13.0 ENCOUNTER FOR SCREENING FOR DISEASES OF THE BLOOD AND BLOOD-FORMING ORGANS AND CERTAIN DISORDERS INVOLVING THE IMMUNE MECHANISM: ICD-10-CM

## 2024-10-15 DIAGNOSIS — Z13.6 ENCOUNTER FOR SCREENING FOR CARDIOVASCULAR DISORDERS: ICD-10-CM

## 2024-10-15 DIAGNOSIS — Z87.19 PERSONAL HISTORY OF OTHER DISEASES OF THE DIGESTIVE SYSTEM: ICD-10-CM

## 2024-10-15 DIAGNOSIS — Z46.89 ENCOUNTER FOR FITTING AND ADJUSTMENT OF OTHER SPECIFIED DEVICES: ICD-10-CM

## 2024-10-15 DIAGNOSIS — Z86.39 PERSONAL HISTORY OF OTHER ENDOCRINE, NUTRITIONAL AND METABOLIC DISEASE: ICD-10-CM

## 2024-10-15 PROCEDURE — 99214 OFFICE O/P EST MOD 30 MIN: CPT | Mod: 25

## 2024-10-15 PROCEDURE — 51701 INSERT BLADDER CATHETER: CPT

## 2024-10-15 PROCEDURE — 99459 PELVIC EXAMINATION: CPT

## 2024-10-15 RX ORDER — DICYCLOMINE HYDROCHLORIDE 10 MG/1
10 CAPSULE ORAL
Qty: 30 | Refills: 0 | Status: ACTIVE | COMMUNITY
Start: 2024-07-22

## 2024-10-15 RX ORDER — DIAZEPAM 5 MG/1
5 TABLET ORAL
Qty: 2 | Refills: 0 | Status: ACTIVE | COMMUNITY
Start: 2024-10-03

## 2024-10-18 LAB — URINE CULTURE <10: NORMAL

## 2024-11-05 ENCOUNTER — APPOINTMENT (OUTPATIENT)
Dept: UROLOGY | Facility: CLINIC | Age: 79
End: 2024-11-05

## 2024-12-05 ENCOUNTER — TRANSCRIPTION ENCOUNTER (OUTPATIENT)
Age: 79
End: 2024-12-05

## 2024-12-05 ENCOUNTER — OUTPATIENT (OUTPATIENT)
Dept: OUTPATIENT SERVICES | Facility: HOSPITAL | Age: 79
LOS: 1 days | Discharge: HOME | End: 2024-12-05
Payer: MEDICARE

## 2024-12-05 VITALS
HEART RATE: 60 BPM | SYSTOLIC BLOOD PRESSURE: 146 MMHG | RESPIRATION RATE: 19 BRPM | OXYGEN SATURATION: 97 % | DIASTOLIC BLOOD PRESSURE: 70 MMHG

## 2024-12-05 VITALS — WEIGHT: 119.93 LBS | HEIGHT: 63 IN

## 2024-12-05 DIAGNOSIS — Z98.890 OTHER SPECIFIED POSTPROCEDURAL STATES: Chronic | ICD-10-CM

## 2024-12-05 DIAGNOSIS — Z90.10 ACQUIRED ABSENCE OF UNSPECIFIED BREAST AND NIPPLE: Chronic | ICD-10-CM

## 2024-12-05 DIAGNOSIS — Z98.82 BREAST IMPLANT STATUS: Chronic | ICD-10-CM

## 2024-12-05 DIAGNOSIS — R13.19 OTHER DYSPHAGIA: ICD-10-CM

## 2024-12-05 DIAGNOSIS — Z90.722 ACQUIRED ABSENCE OF OVARIES, BILATERAL: Chronic | ICD-10-CM

## 2024-12-05 PROCEDURE — 88312 SPECIAL STAINS GROUP 1: CPT

## 2024-12-05 PROCEDURE — 88305 TISSUE EXAM BY PATHOLOGIST: CPT | Mod: 26

## 2024-12-05 PROCEDURE — 88312 SPECIAL STAINS GROUP 1: CPT | Mod: 26

## 2024-12-05 PROCEDURE — 88305 TISSUE EXAM BY PATHOLOGIST: CPT

## 2024-12-05 NOTE — ASU DISCHARGE PLAN (ADULT/PEDIATRIC) - FINANCIAL ASSISTANCE
[facility] provides services at a reduced cost to those who are determined to be eligible through [facility]’s financial assistance program. Information regarding [facility]’s financial assistance program can be found by going to [email or web address] or by calling [phone].

## 2024-12-05 NOTE — PRE-ANESTHESIA EVALUATION ADULT - NSATTENDATTESTRD_GEN_ALL_CORE
The patient has been re-examined and I agree with the above assessment or I updated with my findings. cbc results pending

## 2024-12-05 NOTE — CHART NOTE - NSCHARTNOTEFT_GEN_A_CORE
PACU ANESTHESIA ADMISSION NOTE      Procedure:   Post op diagnosis:      ____  Intubated  TV:______       Rate: ______      FiO2: ______    __x__  Patent Airway    __x__  Full return of protective reflexes    __x__  Full recovery from anesthesia / back to baseline     Vitals:   T:   37        R:   20               BP:  137/72                Sat: 100                  P: 61      Mental Status:  __x__ Awake   ___x__ Alert   _____ Drowsy   _____ Sedated    Nausea/Vomiting:  _x___ NO  ______Yes,   See Post - Op Orders          Pain Scale (0-10):  _____    Treatment: __x__ None    ____ See Post - Op/PCA Orders    Post - Operative Fluids:   ____ Oral   ___x_ See Post - Op Orders    Plan: Discharge:   __x__Home       _____Floor     _____Critical Care    _____  Other:_________________    Comments:    Uneventful anesthesia. Patient transported to  spontaneously breathing and hemodynamically stable.

## 2024-12-06 ENCOUNTER — APPOINTMENT (OUTPATIENT)
Dept: OBGYN | Facility: CLINIC | Age: 79
End: 2024-12-06
Payer: MEDICARE

## 2024-12-06 ENCOUNTER — NON-APPOINTMENT (OUTPATIENT)
Age: 79
End: 2024-12-06

## 2024-12-06 VITALS
BODY MASS INDEX: 21.26 KG/M2 | HEART RATE: 76 BPM | SYSTOLIC BLOOD PRESSURE: 102 MMHG | TEMPERATURE: 98 F | HEIGHT: 63 IN | DIASTOLIC BLOOD PRESSURE: 64 MMHG | OXYGEN SATURATION: 99 % | WEIGHT: 120 LBS

## 2024-12-06 DIAGNOSIS — Z11.51 ENCOUNTER FOR SCREENING FOR HUMAN PAPILLOMAVIRUS (HPV): ICD-10-CM

## 2024-12-06 DIAGNOSIS — Z46.89 ENCOUNTER FOR FITTING AND ADJUSTMENT OF OTHER SPECIFIED DEVICES: ICD-10-CM

## 2024-12-06 LAB
BILIRUB UR QL STRIP: NORMAL
CLARITY UR: CLEAR
COLLECTION METHOD: NORMAL
GLUCOSE UR-MCNC: NORMAL
HCG UR QL: 0.2 EU/DL
HGB UR QL STRIP.AUTO: NORMAL
KETONES UR-MCNC: NORMAL
LEUKOCYTE ESTERASE UR QL STRIP: NORMAL
NITRITE UR QL STRIP: NORMAL
PH UR STRIP: 6
PROT UR STRIP-MCNC: NORMAL
SP GR UR STRIP: 1.02
SURGICAL PATHOLOGY STUDY: SIGNIFICANT CHANGE UP

## 2024-12-06 PROCEDURE — 99213 OFFICE O/P EST LOW 20 MIN: CPT

## 2024-12-06 PROCEDURE — 81003 URINALYSIS AUTO W/O SCOPE: CPT | Mod: QW

## 2024-12-09 DIAGNOSIS — K29.50 UNSPECIFIED CHRONIC GASTRITIS WITHOUT BLEEDING: ICD-10-CM

## 2024-12-09 DIAGNOSIS — I10 ESSENTIAL (PRIMARY) HYPERTENSION: ICD-10-CM

## 2024-12-09 DIAGNOSIS — E78.00 PURE HYPERCHOLESTEROLEMIA, UNSPECIFIED: ICD-10-CM

## 2024-12-09 DIAGNOSIS — R13.19 OTHER DYSPHAGIA: ICD-10-CM

## 2024-12-09 DIAGNOSIS — K21.00 GASTRO-ESOPHAGEAL REFLUX DISEASE WITH ESOPHAGITIS, WITHOUT BLEEDING: ICD-10-CM

## 2024-12-10 LAB — HPV HIGH+LOW RISK DNA PNL CVX: NOT DETECTED

## 2024-12-13 LAB — CYTOLOGY CVX/VAG DOC THIN PREP: ABNORMAL

## 2025-03-04 ENCOUNTER — APPOINTMENT (OUTPATIENT)
Dept: GYNECOLOGIC ONCOLOGY | Facility: CLINIC | Age: 80
End: 2025-03-04
Payer: MEDICARE

## 2025-03-04 VITALS
SYSTOLIC BLOOD PRESSURE: 123 MMHG | DIASTOLIC BLOOD PRESSURE: 69 MMHG | HEART RATE: 74 BPM | HEIGHT: 63 IN | WEIGHT: 120 LBS | BODY MASS INDEX: 21.26 KG/M2

## 2025-03-04 DIAGNOSIS — D05.10 INTRADUCTAL CARCINOMA IN SITU OF UNSPECIFIED BREAST: ICD-10-CM

## 2025-03-04 PROCEDURE — 99213 OFFICE O/P EST LOW 20 MIN: CPT

## 2025-03-04 PROCEDURE — 99459 PELVIC EXAMINATION: CPT

## 2025-04-04 ENCOUNTER — APPOINTMENT (OUTPATIENT)
Dept: OBGYN | Facility: CLINIC | Age: 80
End: 2025-04-04
Payer: MEDICARE

## 2025-04-04 VITALS
DIASTOLIC BLOOD PRESSURE: 70 MMHG | TEMPERATURE: 98 F | WEIGHT: 124 LBS | OXYGEN SATURATION: 99 % | SYSTOLIC BLOOD PRESSURE: 110 MMHG | HEIGHT: 63 IN | HEART RATE: 66 BPM | BODY MASS INDEX: 21.97 KG/M2

## 2025-04-04 DIAGNOSIS — R87.615 UNSATISFACTORY CYTOLOGIC SMEAR OF CERVIX: ICD-10-CM

## 2025-04-04 DIAGNOSIS — Z46.89 ENCOUNTER FOR FITTING AND ADJUSTMENT OF OTHER SPECIFIED DEVICES: ICD-10-CM

## 2025-04-04 LAB
BILIRUB UR QL STRIP: NORMAL
CLARITY UR: CLEAR
COLLECTION METHOD: NORMAL
GLUCOSE UR-MCNC: NORMAL
HCG UR QL: 0.2 EU/DL
HGB UR QL STRIP.AUTO: NORMAL
KETONES UR-MCNC: NORMAL
LEUKOCYTE ESTERASE UR QL STRIP: NORMAL
NITRITE UR QL STRIP: NORMAL
PH UR STRIP: 6
PROT UR STRIP-MCNC: NORMAL
SP GR UR STRIP: 1.02

## 2025-04-04 PROCEDURE — 81003 URINALYSIS AUTO W/O SCOPE: CPT | Mod: QW

## 2025-04-04 PROCEDURE — 99213 OFFICE O/P EST LOW 20 MIN: CPT

## 2025-04-08 LAB — HPV HIGH+LOW RISK DNA PNL CVX: NOT DETECTED

## 2025-04-24 NOTE — DISCUSSION/SUMMARY
[FreeTextEntry1] : \par Cystocele:\par Ring and support #4 removed, cleaned, inspected and NOT reinserted. The patient tolerated this well. \par No bleeding, lesions, abnormal discharge were noted. \par Pessary handed to the patient. Patient will return for pessary re-insertion in the next 1-2 days. \par \par Postmenopausal bleeding\par Will be going for TVUS later today
English

## 2025-05-21 ENCOUNTER — APPOINTMENT (OUTPATIENT)
Age: 80
End: 2025-05-21
Payer: MEDICARE

## 2025-05-21 DIAGNOSIS — M79.671 PAIN IN RIGHT FOOT: ICD-10-CM

## 2025-05-21 DIAGNOSIS — M79.672 PAIN IN LEFT FOOT: ICD-10-CM

## 2025-05-21 DIAGNOSIS — L84 CORNS AND CALLOSITIES: ICD-10-CM

## 2025-05-21 PROCEDURE — 99213 OFFICE O/P EST LOW 20 MIN: CPT

## 2025-05-22 PROBLEM — M79.672 LEFT FOOT PAIN: Status: ACTIVE | Noted: 2025-05-22

## 2025-05-22 PROBLEM — M79.671 RIGHT FOOT PAIN: Status: ACTIVE | Noted: 2025-05-22

## 2025-05-22 PROBLEM — L84 CALLUS: Status: ACTIVE | Noted: 2025-05-22

## 2025-05-29 DIAGNOSIS — B35.1 TINEA UNGUIUM: ICD-10-CM

## 2025-05-29 RX ORDER — CLOTRIMAZOLE 10 MG/ML
1 SOLUTION TOPICAL
Qty: 30 | Refills: 3 | Status: ACTIVE | COMMUNITY
Start: 2025-05-29 | End: 1900-01-01

## 2025-07-01 ENCOUNTER — RX RENEWAL (OUTPATIENT)
Age: 80
End: 2025-07-01

## 2025-07-17 ENCOUNTER — APPOINTMENT (OUTPATIENT)
Dept: UROGYNECOLOGY | Facility: CLINIC | Age: 80
End: 2025-07-17

## 2025-07-17 ENCOUNTER — APPOINTMENT (OUTPATIENT)
Dept: ORTHOPEDIC SURGERY | Facility: CLINIC | Age: 80
End: 2025-07-17

## 2025-07-28 ENCOUNTER — APPOINTMENT (OUTPATIENT)
Dept: OBGYN | Facility: CLINIC | Age: 80
End: 2025-07-28
Payer: MEDICARE

## 2025-07-28 VITALS
OXYGEN SATURATION: 99 % | HEART RATE: 51 BPM | TEMPERATURE: 98 F | DIASTOLIC BLOOD PRESSURE: 74 MMHG | WEIGHT: 120 LBS | SYSTOLIC BLOOD PRESSURE: 116 MMHG | HEIGHT: 62 IN | BODY MASS INDEX: 22.08 KG/M2

## 2025-07-28 DIAGNOSIS — Z46.89 ENCOUNTER FOR FITTING AND ADJUSTMENT OF OTHER SPECIFIED DEVICES: ICD-10-CM

## 2025-07-28 LAB
BILIRUB UR QL STRIP: NEGATIVE
CLARITY UR: NORMAL
COLLECTION METHOD: NORMAL
GLUCOSE UR-MCNC: NEGATIVE
HCG UR QL: 0.2 EU/DL
HGB UR QL STRIP.AUTO: NORMAL
KETONES UR-MCNC: NORMAL
LEUKOCYTE ESTERASE UR QL STRIP: NORMAL
NITRITE UR QL STRIP: NEGATIVE
PH UR STRIP: 5.5
PROT UR STRIP-MCNC: NORMAL
SP GR UR STRIP: >=1.03

## 2025-07-28 PROCEDURE — 99459 PELVIC EXAMINATION: CPT

## 2025-07-28 PROCEDURE — 81003 URINALYSIS AUTO W/O SCOPE: CPT | Mod: QW

## 2025-07-28 PROCEDURE — 99213 OFFICE O/P EST LOW 20 MIN: CPT

## 2025-08-07 ENCOUNTER — APPOINTMENT (OUTPATIENT)
Dept: ORTHOPEDIC SURGERY | Facility: CLINIC | Age: 80
End: 2025-08-07